# Patient Record
Sex: FEMALE | Race: WHITE | NOT HISPANIC OR LATINO | Employment: UNEMPLOYED | URBAN - METROPOLITAN AREA
[De-identification: names, ages, dates, MRNs, and addresses within clinical notes are randomized per-mention and may not be internally consistent; named-entity substitution may affect disease eponyms.]

---

## 2019-05-23 ENCOUNTER — HOSPITAL ENCOUNTER (OUTPATIENT)
Dept: INFUSION THERAPY | Facility: HOSPITAL | Age: 39
Setting detail: RECURRING SERIES
Discharge: HOME | End: 2019-05-31

## 2019-05-23 LAB
BASOPHILS # BLD AUTO: 0.05 10*3/UL (ref 0–0.2)
BASOPHILS # BLD: 0 % (ref 0–3)
BASOPHILS NFR BLD AUTO: 0.6 % (ref 0–3)
CONV ABS BANDS: 0 % (ref 1–5)
CONV ABS IMM GRAN: 0.07 10*3/UL (ref 0–0.2)
CONV ATYPICAL LYMPHOCYTES: 6 % (ref 0–5)
CONV IMMATURE GRAN: 0.8 % (ref 0–1.8)
CONV SEGMENTED NEUTROPHILS: 64 % (ref 45–70)
DEPRECATED RDW RBC AUTO: 39.3 FL (ref 36.4–46.3)
EOSINOPHIL # BLD AUTO: 0.12 10*3/UL (ref 0–0.7)
EOSINOPHIL # BLD AUTO: 1.3 % (ref 0–7)
EOSINOPHIL NFR BLD AUTO: 1 % (ref 0–7)
ERYTHROCYTE [DISTWIDTH] IN BLOOD BY AUTOMATED COUNT: 12 % (ref 11.7–14.4)
ERYTHROCYTE [SEDIMENTATION RATE] IN BLOOD: 79 MM/H (ref 0–20)
HBA1C MFR BLD: 12.8 G/DL (ref 12–16)
HCT VFR BLD AUTO: 37.9 % (ref 37–47)
LYMPHOCYTES # BLD AUTO: 3.04 10*3/UL (ref 1–5)
MCH RBC QN AUTO: 30.3 PG (ref 27–31)
MCHC RBC AUTO-ENTMCNC: 33.8 G/DL (ref 33–37)
MCV RBC AUTO: 89.8 FL (ref 81–99)
METAMYELOCYTES NFR BLD MANUAL: 1 %
MONOCYTES # BLD AUTO: 0.6 10*3/UL (ref 0.2–1.2)
MONOCYTES NFR BLD AUTO: 6.7 % (ref 3–10)
MONOCYTES NFR BLD MANUAL: 6 % (ref 3–10)
NEUTROPHILS # BLD AUTO: 5.03 10*3/UL (ref 2–8)
NEUTROPHILS NFR BLD AUTO: 56.5 % (ref 30–85)
NRBC CBCN: 0.2 % (ref 0–0.7)
NUC CELL # PRT MANUAL: 0 /100{WBCS}
PLAT MORPH BLD: NORMAL
PLATELET # BLD AUTO: 360 10*3/UL (ref 130–400)
PMV BLD AUTO: 9.8 FL (ref 9.4–12.3)
RBC # BLD AUTO: 4.22 10*6/UL (ref 4.2–5.4)
SMALL PLATELETS BLD QL SMEAR: ADEQUATE
VARIANT LYMPHS NFR BLD MANUAL: 22 % (ref 20–45)
VARIANT LYMPHS NFR BLD MANUAL: 34.1 % (ref 20–45)
WBC # BLD AUTO: 8.91 10*3/UL (ref 4.8–10.8)

## 2019-05-24 LAB
ALBUMIN SERPL-MCNC: 4.2 G/DL (ref 3.5–5)
ALBUMIN/GLOB SERPL: 1 {RATIO} (ref 1.4–2.6)
ALP SERPL-CCNC: 33 U/L (ref 42–98)
ALT SERPL-CCNC: 15 U/L (ref 10–40)
ANION GAP SERPL CALC-SCNC: 17 MMOL/L (ref 8–19)
AST SERPL-CCNC: 17 U/L (ref 15–50)
BILIRUB SERPL-MCNC: 0.21 MG/DL (ref 0.2–1.3)
BUN SERPL-MCNC: 11 MG/DL (ref 5–25)
BUN/CREAT SERPL: 10 {RATIO} (ref 6–20)
CALCIUM SERPL-MCNC: 9.5 MG/DL (ref 8.7–10.4)
CHLORIDE SERPL-SCNC: 99 MMOL/L (ref 99–111)
CK SERPL-CCNC: 38 U/L (ref 35–230)
CONV CO2: 27 MMOL/L (ref 22–32)
CONV TOTAL PROTEIN: 8.3 G/DL (ref 6.3–8.2)
CREAT UR-MCNC: 1.13 MG/DL (ref 0.5–0.9)
CRP SERPL-MCNC: 4.1 MG/L (ref 0–5)
GFR SERPLBLD BASED ON 1.73 SQ M-ARVRAT: >60 ML/MIN/{1.73_M2}
GLOBULIN UR ELPH-MCNC: 4.1 G/DL (ref 2–3.5)
GLUCOSE SERPL-MCNC: 100 MG/DL (ref 65–99)
OSMOLALITY SERPL CALC.SUM OF ELEC: 287 MOSM/KG (ref 273–304)
POTASSIUM SERPL-SCNC: 3.7 MMOL/L (ref 3.5–5.3)
SODIUM SERPL-SCNC: 139 MMOL/L (ref 135–147)

## 2021-07-21 ENCOUNTER — APPOINTMENT (OUTPATIENT)
Dept: GENERAL RADIOLOGY | Facility: HOSPITAL | Age: 41
End: 2021-07-21

## 2021-07-21 ENCOUNTER — HOSPITAL ENCOUNTER (EMERGENCY)
Facility: HOSPITAL | Age: 41
Discharge: HOME | End: 2021-07-21
Attending: EMERGENCY MEDICINE | Admitting: EMERGENCY MEDICINE

## 2021-07-21 VITALS
HEART RATE: 76 BPM | OXYGEN SATURATION: 96 % | SYSTOLIC BLOOD PRESSURE: 98 MMHG | WEIGHT: 135.58 LBS | TEMPERATURE: 97.5 F | BODY MASS INDEX: 25.6 KG/M2 | RESPIRATION RATE: 13 BRPM | DIASTOLIC BLOOD PRESSURE: 63 MMHG | HEIGHT: 61 IN

## 2021-07-21 DIAGNOSIS — R07.9 CHEST PAIN, UNSPECIFIED TYPE: Primary | ICD-10-CM

## 2021-07-21 LAB
ALBUMIN SERPL-MCNC: 4.2 G/DL (ref 3.5–5.2)
ALBUMIN/GLOB SERPL: 1.6 G/DL
ALP SERPL-CCNC: 35 U/L (ref 39–117)
ALT SERPL W P-5'-P-CCNC: 75 U/L (ref 1–33)
ANION GAP SERPL CALCULATED.3IONS-SCNC: 10 MMOL/L (ref 5–15)
AST SERPL-CCNC: 58 U/L (ref 1–32)
BASOPHILS # BLD AUTO: 0.02 10*3/MM3 (ref 0–0.2)
BASOPHILS NFR BLD AUTO: 0.4 % (ref 0–1.5)
BILIRUB SERPL-MCNC: 0.2 MG/DL (ref 0–1.2)
BUN SERPL-MCNC: 17 MG/DL (ref 6–20)
BUN/CREAT SERPL: 23.9 (ref 7–25)
CALCIUM SPEC-SCNC: 9.2 MG/DL (ref 8.6–10.5)
CHLORIDE SERPL-SCNC: 103 MMOL/L (ref 98–107)
CK MB SERPL-CCNC: 2.06 NG/ML
CK SERPL-CCNC: 67 U/L (ref 20–180)
CO2 SERPL-SCNC: 28 MMOL/L (ref 22–29)
CREAT SERPL-MCNC: 0.71 MG/DL (ref 0.57–1)
DEPRECATED RDW RBC AUTO: 41.7 FL (ref 37–54)
EOSINOPHIL # BLD AUTO: 0.15 10*3/MM3 (ref 0–0.4)
EOSINOPHIL NFR BLD AUTO: 2.7 % (ref 0.3–6.2)
ERYTHROCYTE [DISTWIDTH] IN BLOOD BY AUTOMATED COUNT: 12.9 % (ref 12.3–15.4)
GFR SERPL CREATININE-BSD FRML MDRD: 90 ML/MIN/1.73
GLOBULIN UR ELPH-MCNC: 2.6 GM/DL
GLUCOSE SERPL-MCNC: 96 MG/DL (ref 65–99)
HCT VFR BLD AUTO: 28.7 % (ref 34–46.6)
HGB BLD-MCNC: 9.5 G/DL (ref 12–15.9)
HOLD SPECIMEN: NORMAL
HOLD SPECIMEN: NORMAL
IMM GRANULOCYTES # BLD AUTO: 0.02 10*3/MM3 (ref 0–0.05)
IMM GRANULOCYTES NFR BLD AUTO: 0.4 % (ref 0–0.5)
LIPASE SERPL-CCNC: 24 U/L (ref 13–60)
LYMPHOCYTES # BLD AUTO: 1.52 10*3/MM3 (ref 0.7–3.1)
LYMPHOCYTES NFR BLD AUTO: 27.4 % (ref 19.6–45.3)
MAGNESIUM SERPL-MCNC: 1.8 MG/DL (ref 1.6–2.6)
MCH RBC QN AUTO: 29.5 PG (ref 26.6–33)
MCHC RBC AUTO-ENTMCNC: 33.1 G/DL (ref 31.5–35.7)
MCV RBC AUTO: 89.1 FL (ref 79–97)
MONOCYTES # BLD AUTO: 0.36 10*3/MM3 (ref 0.1–0.9)
MONOCYTES NFR BLD AUTO: 6.5 % (ref 5–12)
NEUTROPHILS NFR BLD AUTO: 3.47 10*3/MM3 (ref 1.7–7)
NEUTROPHILS NFR BLD AUTO: 62.6 % (ref 42.7–76)
NRBC BLD AUTO-RTO: 0 /100 WBC (ref 0–0.2)
NT-PROBNP SERPL-MCNC: 504.3 PG/ML (ref 0–450)
PLATELET # BLD AUTO: 198 10*3/MM3 (ref 140–450)
PMV BLD AUTO: 10.4 FL (ref 6–12)
POTASSIUM SERPL-SCNC: 3.7 MMOL/L (ref 3.5–5.2)
PROT SERPL-MCNC: 6.8 G/DL (ref 6–8.5)
QT INTERVAL: 344 MS
RBC # BLD AUTO: 3.22 10*6/MM3 (ref 3.77–5.28)
SODIUM SERPL-SCNC: 141 MMOL/L (ref 136–145)
TROPONIN I SERPL-MCNC: 0 NG/ML (ref 0–0.6)
WBC # BLD AUTO: 5.54 10*3/MM3 (ref 3.4–10.8)
WHOLE BLOOD HOLD SPECIMEN: NORMAL

## 2021-07-21 PROCEDURE — 83735 ASSAY OF MAGNESIUM: CPT | Performed by: EMERGENCY MEDICINE

## 2021-07-21 PROCEDURE — 82553 CREATINE MB FRACTION: CPT | Performed by: EMERGENCY MEDICINE

## 2021-07-21 PROCEDURE — 82550 ASSAY OF CK (CPK): CPT | Performed by: EMERGENCY MEDICINE

## 2021-07-21 PROCEDURE — 99283 EMERGENCY DEPT VISIT LOW MDM: CPT

## 2021-07-21 PROCEDURE — 93005 ELECTROCARDIOGRAM TRACING: CPT | Performed by: EMERGENCY MEDICINE

## 2021-07-21 PROCEDURE — 71045 X-RAY EXAM CHEST 1 VIEW: CPT

## 2021-07-21 PROCEDURE — 83880 ASSAY OF NATRIURETIC PEPTIDE: CPT | Performed by: EMERGENCY MEDICINE

## 2021-07-21 PROCEDURE — 83690 ASSAY OF LIPASE: CPT | Performed by: EMERGENCY MEDICINE

## 2021-07-21 PROCEDURE — 85025 COMPLETE CBC W/AUTO DIFF WBC: CPT | Performed by: EMERGENCY MEDICINE

## 2021-07-21 PROCEDURE — 36415 COLL VENOUS BLD VENIPUNCTURE: CPT

## 2021-07-21 PROCEDURE — 84484 ASSAY OF TROPONIN QUANT: CPT

## 2021-07-21 PROCEDURE — 80053 COMPREHEN METABOLIC PANEL: CPT | Performed by: EMERGENCY MEDICINE

## 2021-07-21 PROCEDURE — 93010 ELECTROCARDIOGRAM REPORT: CPT | Performed by: INTERNAL MEDICINE

## 2021-07-21 RX ORDER — ASPIRIN 81 MG/1
324 TABLET, CHEWABLE ORAL ONCE
Status: DISCONTINUED | OUTPATIENT
Start: 2021-07-21 | End: 2021-07-21

## 2021-07-21 RX ORDER — SODIUM CHLORIDE 0.9 % (FLUSH) 0.9 %
10 SYRINGE (ML) INJECTION AS NEEDED
Status: DISCONTINUED | OUTPATIENT
Start: 2021-07-21 | End: 2021-07-21 | Stop reason: HOSPADM

## 2021-07-21 NOTE — ED PROVIDER NOTES
"Subjective     History provided by:  Patient ()    Pt is a 41 y.o. female who presents to ED via private vehicle accompanied by  for CP. This started today and is still present. It is moderate in severity.     The officer reports that as she \"cuffing\" the pt for DUI, the pt started complaining of CP.     Review of Systems   Constitutional: Negative for diaphoresis.   HENT: Negative for ear discharge and nosebleeds.    Eyes: Negative for discharge.   Respiratory: Negative for cough.    Cardiovascular: Positive for chest pain.   Gastrointestinal: Negative for vomiting.   All other systems reviewed and are negative.      History reviewed. No pertinent past medical history.    Allergies   Allergen Reactions   • Zofran [Ondansetron] Rash       Past Surgical History:   Procedure Laterality Date   • CARDIAC VALVE REPLACEMENT     • DILATATION AND CURETTAGE     • TUBAL ABDOMINAL LIGATION         History reviewed. No pertinent family history.    Social History     Socioeconomic History   • Marital status:      Spouse name: Not on file   • Number of children: Not on file   • Years of education: Not on file   • Highest education level: Not on file   Tobacco Use   • Smoking status: Current Every Day Smoker     Packs/day: 2.00   Substance and Sexual Activity   • Drug use: Yes     Comment: heroin occasionally          Objective   Physical Exam  Vitals and nursing note reviewed.   Constitutional:       General: She is not in acute distress.  HENT:      Head: Normocephalic and atraumatic.      Nose: Nose normal.   Eyes:      General: No scleral icterus.  Cardiovascular:      Rate and Rhythm: Normal rate and regular rhythm.      Heart sounds: Normal heart sounds.   Pulmonary:      Effort: Pulmonary effort is normal. No respiratory distress.      Breath sounds: Normal breath sounds.   Abdominal:      General: There is no distension.   Musculoskeletal:         General: Normal range of motion.      " Cervical back: Neck supple.      Right lower leg: No edema.      Left lower leg: No edema.   Skin:     General: Skin is warm and dry.      Findings: No rash.   Neurological:      Mental Status: She is alert.         Procedures    EKG: Sinus tachycardia with a rate of 111. Normal P waves and BETHANY. Normal QRS and axis. Normal ST segments and QTc. No previous for comparison.          ED Course                                            MDM  This is a 41-year-old female who presented for evaluation after she reported chest pain to police in the midst of a DUI arrest.  The patient here in the department is in no acute distress.  Her EKG and troponin are nonischemic.  Her chest x-ray shows no evidence of pneumonia or pneumothorax or trauma.  From my standpoint I think she is medically stable for incarceration.  Final diagnoses:   Chest pain, unspecified type       Documentation assistance provided by simran Mejia.  Information recorded by the scribe was done at my direction and has been verified and validated by me.     Kimberly Mejia  07/21/21 1401       Kimberly Mejia  07/21/21 [id number]       Latrell Sigala DO  07/21/21 1810

## 2025-03-09 ENCOUNTER — HOSPITAL ENCOUNTER (INPATIENT)
Facility: HOSPITAL | Age: 45
LOS: 2 days | Discharge: HOME | End: 2025-03-13
Attending: EMERGENCY MEDICINE | Admitting: INTERNAL MEDICINE
Payer: MEDICARE

## 2025-03-09 DIAGNOSIS — M46.20 OSTEOMYELITIS OF SPINE: ICD-10-CM

## 2025-03-09 DIAGNOSIS — Z09 FOLLOW-UP EXAM: ICD-10-CM

## 2025-03-09 DIAGNOSIS — M54.9 INTRACTABLE BACK PAIN: Primary | ICD-10-CM

## 2025-03-09 PROCEDURE — 99285 EMERGENCY DEPT VISIT HI MDM: CPT

## 2025-03-09 PROCEDURE — 36415 COLL VENOUS BLD VENIPUNCTURE: CPT

## 2025-03-09 NOTE — Clinical Note
Level of Care: Med/Surg [1]   Diagnosis: Osteomyelitis of spine [460621]   Admitting Physician: TRINA GUERRERO [[id number]]   Attending Physician: TRINA GUERRERO [[id number]]   Is patient appropriate for Inpatient Observation Unit?: No [0]

## 2025-03-10 ENCOUNTER — APPOINTMENT (OUTPATIENT)
Dept: GENERAL RADIOLOGY | Facility: HOSPITAL | Age: 45
End: 2025-03-10
Payer: MEDICARE

## 2025-03-10 ENCOUNTER — APPOINTMENT (OUTPATIENT)
Dept: MRI IMAGING | Facility: HOSPITAL | Age: 45
End: 2025-03-10
Payer: MEDICARE

## 2025-03-10 PROBLEM — K21.9 GASTROESOPHAGEAL REFLUX DISEASE: Status: ACTIVE | Noted: 2023-02-06

## 2025-03-10 PROBLEM — M46.20 OSTEOMYELITIS OF SPINE: Status: ACTIVE | Noted: 2025-03-10

## 2025-03-10 PROBLEM — Z95.2 H/O MITRAL VALVE REPLACEMENT: Status: ACTIVE | Noted: 2025-03-10

## 2025-03-10 PROBLEM — G06.2 EPIDURAL ABSCESS: Status: ACTIVE | Noted: 2025-03-10

## 2025-03-10 PROBLEM — B19.10 HEPATITIS B: Status: ACTIVE | Noted: 2025-03-10

## 2025-03-10 PROBLEM — J44.9 CHRONIC OBSTRUCTIVE PULMONARY DISEASE: Status: ACTIVE | Noted: 2024-10-07

## 2025-03-10 PROBLEM — Z86.79 PERSONAL HISTORY OF OTHER DISEASES OF THE CIRCULATORY SYSTEM: Status: ACTIVE | Noted: 2019-09-13

## 2025-03-10 PROBLEM — F15.10 AMPHETAMINE ABUSE: Status: ACTIVE | Noted: 2017-11-29

## 2025-03-10 PROBLEM — I42.9 CARDIOMYOPATHY: Status: ACTIVE | Noted: 2017-10-15

## 2025-03-10 PROBLEM — F19.90 INTRAVENOUS DRUG USER: Status: ACTIVE | Noted: 2019-09-13

## 2025-03-10 PROBLEM — M86.9 OSTEOMYELITIS: Status: ACTIVE | Noted: 2025-03-10

## 2025-03-10 PROBLEM — B18.1 CHRONIC HEPATITIS B VIRUS INFECTION: Status: ACTIVE | Noted: 2025-03-10

## 2025-03-10 PROBLEM — F11.21 OPIOID USE DISORDER, SEVERE, IN EARLY REMISSION: Status: ACTIVE | Noted: 2020-04-21

## 2025-03-10 PROBLEM — I50.9 CONGESTIVE HEART FAILURE: Status: ACTIVE | Noted: 2023-02-06

## 2025-03-10 LAB
ALBUMIN SERPL-MCNC: 4 G/DL (ref 3.5–5.2)
ALBUMIN SERPL-MCNC: 4.2 G/DL (ref 3.5–5.2)
ALBUMIN/GLOB SERPL: 1 G/DL
ALBUMIN/GLOB SERPL: 1.1 G/DL
ALP SERPL-CCNC: 50 U/L (ref 39–117)
ALP SERPL-CCNC: 57 U/L (ref 39–117)
ALT SERPL W P-5'-P-CCNC: 8 U/L (ref 1–33)
ALT SERPL W P-5'-P-CCNC: 9 U/L (ref 1–33)
ANION GAP SERPL CALCULATED.3IONS-SCNC: 10.6 MMOL/L (ref 5–15)
ANION GAP SERPL CALCULATED.3IONS-SCNC: 13.3 MMOL/L (ref 5–15)
AST SERPL-CCNC: 16 U/L (ref 1–32)
AST SERPL-CCNC: 16 U/L (ref 1–32)
BASOPHILS # BLD AUTO: 0.02 10*3/MM3 (ref 0–0.2)
BASOPHILS NFR BLD AUTO: 0.2 % (ref 0–1.5)
BILIRUB SERPL-MCNC: <0.2 MG/DL (ref 0–1.2)
BILIRUB SERPL-MCNC: <0.2 MG/DL (ref 0–1.2)
BUN SERPL-MCNC: 16 MG/DL (ref 6–20)
BUN SERPL-MCNC: 20 MG/DL (ref 6–20)
BUN/CREAT SERPL: 25.4 (ref 7–25)
BUN/CREAT SERPL: 28.2 (ref 7–25)
CALCIUM SPEC-SCNC: 9.6 MG/DL (ref 8.6–10.5)
CALCIUM SPEC-SCNC: 9.7 MG/DL (ref 8.6–10.5)
CHLORIDE SERPL-SCNC: 104 MMOL/L (ref 98–107)
CHLORIDE SERPL-SCNC: 106 MMOL/L (ref 98–107)
CO2 SERPL-SCNC: 23.7 MMOL/L (ref 22–29)
CO2 SERPL-SCNC: 24.4 MMOL/L (ref 22–29)
CREAT SERPL-MCNC: 0.63 MG/DL (ref 0.57–1)
CREAT SERPL-MCNC: 0.71 MG/DL (ref 0.57–1)
CRP SERPL-MCNC: 5.73 MG/DL (ref 0–0.5)
D-LACTATE SERPL-SCNC: 1.2 MMOL/L (ref 0.5–2)
DEPRECATED RDW RBC AUTO: 43.2 FL (ref 37–54)
DEPRECATED RDW RBC AUTO: 44.4 FL (ref 37–54)
EGFRCR SERPLBLD CKD-EPI 2021: 106.3 ML/MIN/1.73
EGFRCR SERPLBLD CKD-EPI 2021: 111 ML/MIN/1.73
EOSINOPHIL # BLD AUTO: 0.09 10*3/MM3 (ref 0–0.4)
EOSINOPHIL NFR BLD AUTO: 1.1 % (ref 0.3–6.2)
ERYTHROCYTE [DISTWIDTH] IN BLOOD BY AUTOMATED COUNT: 14.4 % (ref 12.3–15.4)
ERYTHROCYTE [DISTWIDTH] IN BLOOD BY AUTOMATED COUNT: 14.5 % (ref 12.3–15.4)
ERYTHROCYTE [SEDIMENTATION RATE] IN BLOOD: 77 MM/HR (ref 0–20)
GLOBULIN UR ELPH-MCNC: 4 GM/DL
GLOBULIN UR ELPH-MCNC: 4.1 GM/DL
GLUCOSE SERPL-MCNC: 110 MG/DL (ref 65–99)
GLUCOSE SERPL-MCNC: 93 MG/DL (ref 65–99)
HCT VFR BLD AUTO: 32.3 % (ref 34–46.6)
HCT VFR BLD AUTO: 32.6 % (ref 34–46.6)
HGB BLD-MCNC: 10.5 G/DL (ref 12–15.9)
HGB BLD-MCNC: 10.5 G/DL (ref 12–15.9)
IMM GRANULOCYTES # BLD AUTO: 0.02 10*3/MM3 (ref 0–0.05)
IMM GRANULOCYTES NFR BLD AUTO: 0.2 % (ref 0–0.5)
LYMPHOCYTES # BLD AUTO: 2.08 10*3/MM3 (ref 0.7–3.1)
LYMPHOCYTES NFR BLD AUTO: 26 % (ref 19.6–45.3)
MCH RBC QN AUTO: 27 PG (ref 26.6–33)
MCH RBC QN AUTO: 27 PG (ref 26.6–33)
MCHC RBC AUTO-ENTMCNC: 32.2 G/DL (ref 31.5–35.7)
MCHC RBC AUTO-ENTMCNC: 32.5 G/DL (ref 31.5–35.7)
MCV RBC AUTO: 83 FL (ref 79–97)
MCV RBC AUTO: 83.8 FL (ref 79–97)
MONOCYTES # BLD AUTO: 0.47 10*3/MM3 (ref 0.1–0.9)
MONOCYTES NFR BLD AUTO: 5.9 % (ref 5–12)
NEUTROPHILS NFR BLD AUTO: 5.33 10*3/MM3 (ref 1.7–7)
NEUTROPHILS NFR BLD AUTO: 66.6 % (ref 42.7–76)
NRBC BLD AUTO-RTO: 0 /100 WBC (ref 0–0.2)
PLATELET # BLD AUTO: 223 10*3/MM3 (ref 140–450)
PLATELET # BLD AUTO: 303 10*3/MM3 (ref 140–450)
PMV BLD AUTO: 9.3 FL (ref 6–12)
PMV BLD AUTO: 9.7 FL (ref 6–12)
POTASSIUM SERPL-SCNC: 3.5 MMOL/L (ref 3.5–5.2)
POTASSIUM SERPL-SCNC: 3.8 MMOL/L (ref 3.5–5.2)
PROT SERPL-MCNC: 8.1 G/DL (ref 6–8.5)
PROT SERPL-MCNC: 8.2 G/DL (ref 6–8.5)
RBC # BLD AUTO: 3.89 10*6/MM3 (ref 3.77–5.28)
RBC # BLD AUTO: 3.89 10*6/MM3 (ref 3.77–5.28)
SODIUM SERPL-SCNC: 139 MMOL/L (ref 136–145)
SODIUM SERPL-SCNC: 143 MMOL/L (ref 136–145)
WBC NRBC COR # BLD AUTO: 7.07 10*3/MM3 (ref 3.4–10.8)
WBC NRBC COR # BLD AUTO: 8.01 10*3/MM3 (ref 3.4–10.8)

## 2025-03-10 PROCEDURE — 83605 ASSAY OF LACTIC ACID: CPT | Performed by: NURSE PRACTITIONER

## 2025-03-10 PROCEDURE — A9577 INJ MULTIHANCE: HCPCS | Performed by: INTERNAL MEDICINE

## 2025-03-10 PROCEDURE — 25010000002 LORAZEPAM PER 2 MG: Performed by: NURSE PRACTITIONER

## 2025-03-10 PROCEDURE — 25510000002 GADOBENATE DIMEGLUMINE 529 MG/ML SOLUTION: Performed by: INTERNAL MEDICINE

## 2025-03-10 PROCEDURE — 85025 COMPLETE CBC W/AUTO DIFF WBC: CPT | Performed by: EMERGENCY MEDICINE

## 2025-03-10 PROCEDURE — G0378 HOSPITAL OBSERVATION PER HR: HCPCS

## 2025-03-10 PROCEDURE — 36415 COLL VENOUS BLD VENIPUNCTURE: CPT | Performed by: NURSE PRACTITIONER

## 2025-03-10 PROCEDURE — 80053 COMPREHEN METABOLIC PANEL: CPT | Performed by: EMERGENCY MEDICINE

## 2025-03-10 PROCEDURE — 86140 C-REACTIVE PROTEIN: CPT | Performed by: EMERGENCY MEDICINE

## 2025-03-10 PROCEDURE — 85027 COMPLETE CBC AUTOMATED: CPT | Performed by: NURSE PRACTITIONER

## 2025-03-10 PROCEDURE — 80053 COMPREHEN METABOLIC PANEL: CPT | Performed by: NURSE PRACTITIONER

## 2025-03-10 PROCEDURE — 25010000002 HYDROMORPHONE 1 MG/ML SOLUTION: Performed by: EMERGENCY MEDICINE

## 2025-03-10 PROCEDURE — 85652 RBC SED RATE AUTOMATED: CPT | Performed by: EMERGENCY MEDICINE

## 2025-03-10 PROCEDURE — 25010000002 DAPTOMYCIN PER 1 MG: Performed by: NURSE PRACTITIONER

## 2025-03-10 PROCEDURE — 72158 MRI LUMBAR SPINE W/O & W/DYE: CPT

## 2025-03-10 PROCEDURE — 25010000002 CEFTRIAXONE PER 250 MG: Performed by: NURSE PRACTITIONER

## 2025-03-10 RX ORDER — SODIUM CHLORIDE 0.9 % (FLUSH) 0.9 %
10 SYRINGE (ML) INJECTION AS NEEDED
Status: DISCONTINUED | OUTPATIENT
Start: 2025-03-10 | End: 2025-03-13 | Stop reason: HOSPADM

## 2025-03-10 RX ORDER — ACETAMINOPHEN 325 MG/1
650 TABLET ORAL EVERY 4 HOURS PRN
Status: DISCONTINUED | OUTPATIENT
Start: 2025-03-10 | End: 2025-03-13 | Stop reason: HOSPADM

## 2025-03-10 RX ORDER — AMOXICILLIN 250 MG
2 CAPSULE ORAL 2 TIMES DAILY PRN
Status: DISCONTINUED | OUTPATIENT
Start: 2025-03-10 | End: 2025-03-13 | Stop reason: HOSPADM

## 2025-03-10 RX ORDER — NITROGLYCERIN 0.4 MG/1
0.4 TABLET SUBLINGUAL
Status: DISCONTINUED | OUTPATIENT
Start: 2025-03-10 | End: 2025-03-13 | Stop reason: HOSPADM

## 2025-03-10 RX ORDER — HYDROCODONE BITARTRATE AND ACETAMINOPHEN 5; 325 MG/1; MG/1
1 TABLET ORAL EVERY 6 HOURS PRN
Refills: 0 | Status: DISCONTINUED | OUTPATIENT
Start: 2025-03-10 | End: 2025-03-10

## 2025-03-10 RX ORDER — SODIUM CHLORIDE 9 MG/ML
40 INJECTION, SOLUTION INTRAVENOUS AS NEEDED
Status: DISCONTINUED | OUTPATIENT
Start: 2025-03-10 | End: 2025-03-13 | Stop reason: HOSPADM

## 2025-03-10 RX ORDER — LORAZEPAM 2 MG/ML
1 INJECTION INTRAMUSCULAR ONCE
Status: COMPLETED | OUTPATIENT
Start: 2025-03-10 | End: 2025-03-10

## 2025-03-10 RX ORDER — BISACODYL 10 MG
10 SUPPOSITORY, RECTAL RECTAL DAILY PRN
Status: DISCONTINUED | OUTPATIENT
Start: 2025-03-10 | End: 2025-03-13 | Stop reason: HOSPADM

## 2025-03-10 RX ORDER — ACETAMINOPHEN 160 MG/5ML
650 SOLUTION ORAL EVERY 4 HOURS PRN
Status: DISCONTINUED | OUTPATIENT
Start: 2025-03-10 | End: 2025-03-13 | Stop reason: HOSPADM

## 2025-03-10 RX ORDER — HYDROCODONE BITARTRATE AND ACETAMINOPHEN 5; 325 MG/1; MG/1
1 TABLET ORAL EVERY 4 HOURS PRN
Refills: 0 | Status: DISCONTINUED | OUTPATIENT
Start: 2025-03-10 | End: 2025-03-11

## 2025-03-10 RX ORDER — SODIUM CHLORIDE 0.9 % (FLUSH) 0.9 %
10 SYRINGE (ML) INJECTION EVERY 12 HOURS SCHEDULED
Status: DISCONTINUED | OUTPATIENT
Start: 2025-03-10 | End: 2025-03-13 | Stop reason: HOSPADM

## 2025-03-10 RX ORDER — FAMOTIDINE 20 MG/1
20 TABLET, FILM COATED ORAL 2 TIMES DAILY PRN
Status: DISCONTINUED | OUTPATIENT
Start: 2025-03-10 | End: 2025-03-13 | Stop reason: HOSPADM

## 2025-03-10 RX ORDER — ACETAMINOPHEN 650 MG/1
650 SUPPOSITORY RECTAL EVERY 4 HOURS PRN
Status: DISCONTINUED | OUTPATIENT
Start: 2025-03-10 | End: 2025-03-13 | Stop reason: HOSPADM

## 2025-03-10 RX ORDER — BISACODYL 5 MG/1
5 TABLET, DELAYED RELEASE ORAL DAILY PRN
Status: DISCONTINUED | OUTPATIENT
Start: 2025-03-10 | End: 2025-03-13 | Stop reason: HOSPADM

## 2025-03-10 RX ORDER — POLYETHYLENE GLYCOL 3350 17 G/17G
17 POWDER, FOR SOLUTION ORAL DAILY PRN
Status: DISCONTINUED | OUTPATIENT
Start: 2025-03-10 | End: 2025-03-13 | Stop reason: HOSPADM

## 2025-03-10 RX ADMIN — LORAZEPAM 1 MG: 2 INJECTION INTRAMUSCULAR; INTRAVENOUS at 07:44

## 2025-03-10 RX ADMIN — DAPTOMYCIN 500 MG: 500 INJECTION, POWDER, LYOPHILIZED, FOR SOLUTION INTRAVENOUS at 11:49

## 2025-03-10 RX ADMIN — Medication 10 ML: at 08:53

## 2025-03-10 RX ADMIN — GADOBENATE DIMEGLUMINE 12 ML: 529 INJECTION, SOLUTION INTRAVENOUS at 10:28

## 2025-03-10 RX ADMIN — HYDROCODONE BITARTRATE AND ACETAMINOPHEN 1 TABLET: 5; 325 TABLET ORAL at 12:32

## 2025-03-10 RX ADMIN — HYDROMORPHONE HYDROCHLORIDE 1 MG: 1 INJECTION, SOLUTION INTRAMUSCULAR; INTRAVENOUS; SUBCUTANEOUS at 00:15

## 2025-03-10 RX ADMIN — ACETAMINOPHEN 650 MG: 325 TABLET, FILM COATED ORAL at 16:21

## 2025-03-10 RX ADMIN — CEFTRIAXONE SODIUM 1000 MG: 1 INJECTION, POWDER, FOR SOLUTION INTRAMUSCULAR; INTRAVENOUS at 07:43

## 2025-03-10 RX ADMIN — HYDROMORPHONE HYDROCHLORIDE 1 MG: 1 INJECTION, SOLUTION INTRAMUSCULAR; INTRAVENOUS; SUBCUTANEOUS at 01:43

## 2025-03-10 RX ADMIN — Medication 10 ML: at 20:52

## 2025-03-10 RX ADMIN — HYDROCODONE BITARTRATE AND ACETAMINOPHEN 1 TABLET: 5; 325 TABLET ORAL at 06:13

## 2025-03-10 RX ADMIN — HYDROCODONE BITARTRATE AND ACETAMINOPHEN 1 TABLET: 5; 325 TABLET ORAL at 18:20

## 2025-03-10 RX ADMIN — ACETAMINOPHEN 650 MG: 325 TABLET, FILM COATED ORAL at 20:49

## 2025-03-10 RX ADMIN — HYDROCODONE BITARTRATE AND ACETAMINOPHEN 1 TABLET: 5; 325 TABLET ORAL at 22:47

## 2025-03-10 NOTE — ED NOTES
"Nursing report ED to floor  Dawn Young  44 y.o.  female    HPI :  HPI  Stated Reason for Visit: Bilayeral lower back pain. Hx of spine abcesses.  History Obtained From: patient  Precipitating Event(s): other (see comments) (hx of spine abscess.)  Duration (Weeks): 16    Chief Complaint  Chief Complaint   Patient presents with    Abscess     Back pain       Admitting doctor:   Sunita Mar MD    Admitting diagnosis:   The primary encounter diagnosis was Intractable back pain. A diagnosis of Osteomyelitis of spine was also pertinent to this visit.    Code status:   Current Code Status       Date Active Code Status Order ID Comments User Context       Not on file            Allergies:   Toradol [ketorolac tromethamine] and Zofran [ondansetron]    Isolation:   No active isolations    Intake and Output  No intake or output data in the 24 hours ending 03/10/25 0238    Weight:       03/09/25  2334   Weight: 62.6 kg (138 lb)       Most recent vitals:   Vitals:    03/09/25 2333 03/09/25 2334 03/09/25 2337 03/09/25 2343   BP:   (!) 163/123 (!) 132/108   BP Location:   Left arm Left arm   Patient Position:   Sitting Sitting   Pulse:  114     Resp:  18     Temp:  97.2 °F (36.2 °C)     TempSrc:  Tympanic     SpO2:  100%     Weight: 62.6 kg (138 lb) 62.6 kg (138 lb)     Height: 157.5 cm (62\") 157.5 cm (62\")         Active LDAs/IV Access:   Lines, Drains & Airways       Active LDAs       Name Placement date Placement time Site Days    PICC Single Lumen 03/10/25 Right Other (Comment) 03/10/25  0015  Other (Comment)  PT doesnt know its from outpatient antibiotics  less than 1    Peripheral IV 07/21/21 1434 Right Antecubital 07/21/21  1434  Antecubital  1327                    Labs (abnormal labs have a star):   Labs Reviewed   COMPREHENSIVE METABOLIC PANEL - Abnormal; Notable for the following components:       Result Value    Glucose 110 (*)     BUN/Creatinine Ratio 28.2 (*)     All other components within normal limits    " Narrative:     GFR Categories in Chronic Kidney Disease (CKD)      GFR Category          GFR (mL/min/1.73)    Interpretation  G1                     90 or greater         Normal or high (1)  G2                      60-89                Mild decrease (1)  G3a                   45-59                Mild to moderate decrease  G3b                   30-44                Moderate to severe decrease  G4                    15-29                Severe decrease  G5                    14 or less           Kidney failure          (1)In the absence of evidence of kidney disease, neither GFR category G1 or G2 fulfill the criteria for CKD.    eGFR calculation 2021 CKD-EPI creatinine equation, which does not include race as a factor   C-REACTIVE PROTEIN - Abnormal; Notable for the following components:    C-Reactive Protein 5.73 (*)     All other components within normal limits   SEDIMENTATION RATE - Abnormal; Notable for the following components:    Sed Rate 77 (*)     All other components within normal limits   CBC WITH AUTO DIFFERENTIAL - Abnormal; Notable for the following components:    Hemoglobin 10.5 (*)     Hematocrit 32.6 (*)     All other components within normal limits   CBC AND DIFFERENTIAL    Narrative:     The following orders were created for panel order CBC & Differential.  Procedure                               Abnormality         Status                     ---------                               -----------         ------                     CBC Auto Differential[[id number]]        Abnormal            Final result                 Please view results for these tests on the individual orders.       EKG:   No orders to display       Meds given in ED:   Medications   HYDROmorphone (DILAUDID) injection 1 mg (1 mg Intravenous Given 3/10/25 0015)   HYDROmorphone (DILAUDID) injection 1 mg (1 mg Intravenous Given 3/10/25 0143)       Imaging results:  No radiology results for the last day    Ambulatory status:   -  assist    Social issues:   Social History     Socioeconomic History    Marital status:    Tobacco Use    Smoking status: Every Day     Current packs/day: 2.00     Types: Cigarettes   Substance and Sexual Activity    Alcohol use: Not Currently    Drug use: Yes     Types: Marijuana     Comment: heroin occasionally        Peripheral Neurovascular       Neuro Cognitive       Learning       Respiratory       Abdominal Pain       Pain Assessments  Pain (Adult)  (0-10) Pain Rating: Rest: 9  Pain Location: back  Pain Side/Orientation: bilateral, lower    NIH Stroke Scale       Milli MILA Jackson  03/10/25 02:38 EDT

## 2025-03-10 NOTE — CASE MANAGEMENT/SOCIAL WORK
Continued Stay Note  [facility]     Patient Name: Dawn Young  MRN: [id number]  Today's Date: 3/10/2025    Admit Date: 3/9/2025        Discharge Plan       Row Name 03/10/25 1434       Plan    Plan Comments CCP tried to screen patient two times today and was only able to get very few answers.  Pt appeared to be drowsy and could not stay awake for more then five seconds at a time.  Pt did confirm she lives alone in a first floor apartment and is IADL's.  Pt reports she does not drive because she no longer has a license.  CCP noted a PICC line in her right upper arm.  Pt reports her Pharmacy is [place], [place] and her PCP is from [facility] in [place].  Pt fell back to sleep and would not uncover her head with blanket for rest of interview...........Griselda REYES/STACI                   Discharge Codes    No documentation.                 Expected Discharge Date and Time       Expected Discharge Date Expected Discharge Time    Mar 12, 2025               Griselda Wagner RN

## 2025-03-10 NOTE — PLAN OF CARE
Goal Outcome Evaluation:  Plan of Care Reviewed With: patient        Progress: no change  Outcome Evaluation: vss. a&ox4. ra. ambulates ad otto. reg diet. meds whole with thins. PICC RUE - dsg changed 3/9/25. con't iv abx per order. educated on bp monitoring. plan to dc home when appropriate. con't plan of care.

## 2025-03-10 NOTE — SIGNIFICANT NOTE
03/10/25 1011   OTHER   Discipline physical therapist   Rehab Time/Intention   Session Not Performed other (see comments)  (per nsg, pt has been up ad otto. no acute PT needs at this time. Pt leaving floor for MRI. Will sign off. please re-consult if any needs arise.)   Recommendation   PT - Next Appointment 03/11/25

## 2025-03-10 NOTE — PLAN OF CARE
Goal Outcome Evaluation:  Plan of Care Reviewed With: patient     Outcome Evaluation: Pt admitted with osteomyelitis of the spine. MRI scheduled for this morning with paperwork completed. SR- ST on the tele monitor. VSS on room air this morning. Pain treated per MAR. PICC line placement verified with XR. IV abx started. Will continue with POC.

## 2025-03-10 NOTE — ED PROVIDER NOTES
EMERGENCY DEPARTMENT ENCOUNTER  Room Number:  05/05  PCP: Provider, No Known  Independent Historians: Patient      HPI:  Chief Complaint: had concerns including Abscess (Back pain).     A complete HPI/ROS/PMH/PSH/SH/FH are unobtainable due to: None    Chronic or social conditions impacting patient care (Social Determinants of Health): None      Context: Dawn Young is a 44 y.o. female with a medical history of IVDU, heart failure, endocarditis, mitral valve replacement who presents to the ED c/o acute back pain.  Patient reports she has been dealing with endocarditis, thoracic and lumbar epidural abscesses, and discitis osteomyelitis.  Is currently on Rocephin and daptomycin.  Reports she has been on Suboxone but has not been taking this for approximately 1 month.  She presents today primarily complaining of persistent back pain.  States she has an upcoming appointment with pain management.  No recent fall or back injury.  Has had no prior back surgeries.  She denies bowel/bladder incontinence, saddle anesthesia, or numbness/weakness of the extremities.  No other systemic complaints at this time.      Review of prior external notes (non-ED) -and- Review of prior external test results outside of this encounter:  Patient seen in office by infectious disease on 3/6/2025 for osteomyelitis.  Reviewed assessment and plan.  Patient will continue ceftriaxone and daptomycin through 4/1/2025.  Reviewed labs collected yesterday.  CBC with hemoglobin 11, CMP with creatinine 0.51.    Prescription drug monitoring program review:     N/A    PAST MEDICAL HISTORY  Active Ambulatory Problems     Diagnosis Date Noted    No Active Ambulatory Problems     Resolved Ambulatory Problems     Diagnosis Date Noted    No Resolved Ambulatory Problems     No Additional Past Medical History         PAST SURGICAL HISTORY  Past Surgical History:   Procedure Laterality Date    CARDIAC VALVE REPLACEMENT      DILATATION AND CURETTAGE      TUBAL  ABDOMINAL LIGATION           FAMILY HISTORY  History reviewed. No pertinent family history.      SOCIAL HISTORY  Social History     Socioeconomic History    Marital status:    Tobacco Use    Smoking status: Every Day     Current packs/day: 2.00     Types: Cigarettes   Substance and Sexual Activity    Alcohol use: Not Currently    Drug use: Yes     Types: Marijuana     Comment: heroin occasionally          ALLERGIES  Toradol [ketorolac tromethamine] and Zofran [ondansetron]      REVIEW OF SYSTEMS  Included in HPI  All systems reviewed and negative except for those discussed in HPI.      PHYSICAL EXAM    I have reviewed the triage vital signs and nursing notes.    ED Triage Vitals   Temp Heart Rate Resp BP SpO2   03/09/25 2334 03/09/25 2334 03/09/25 2334 03/09/25 2337 03/09/25 2334   97.2 °F (36.2 °C) 114 18 (!) 163/123 100 %      Temp src Heart Rate Source Patient Position BP Location FiO2 (%)   03/09/25 2334 -- 03/09/25 2337 03/09/25 2337 --   Tympanic  Sitting Left arm        Physical Exam  Constitutional:       General: She is not in acute distress.     Appearance: She is well-developed.   HENT:      Head: Normocephalic and atraumatic.   Eyes:      Extraocular Movements: Extraocular movements intact.   Cardiovascular:      Rate and Rhythm: Normal rate and regular rhythm.      Heart sounds: Normal heart sounds.      Comments: PICC line right upper extremity.  Distal pulses intact  Pulmonary:      Effort: Pulmonary effort is normal.      Breath sounds: Normal breath sounds.   Abdominal:      General: There is no distension.   Musculoskeletal:      Comments: Diffuse thoracolumbar spine tenderness.  No step-off or deformity   Skin:     General: Skin is warm.   Neurological:      General: No focal deficit present.      Mental Status: She is alert and oriented to person, place, and time.      Comments: SILT all 4 extremities.  Motor strength 5/5 all 4 extremities   Psychiatric:         Mood and Affect: Mood  normal.             LAB RESULTS  Recent Results (from the past 24 hours)   Comprehensive Metabolic Panel    Collection Time: 03/10/25 12:11 AM    Specimen: Blood   Result Value Ref Range    Glucose 110 (H) 65 - 99 mg/dL    BUN 20 6 - 20 mg/dL    Creatinine 0.71 0.57 - 1.00 mg/dL    Sodium 143 136 - 145 mmol/L    Potassium 3.5 3.5 - 5.2 mmol/L    Chloride 106 98 - 107 mmol/L    CO2 23.7 22.0 - 29.0 mmol/L    Calcium 9.6 8.6 - 10.5 mg/dL    Total Protein 8.2 6.0 - 8.5 g/dL    Albumin 4.2 3.5 - 5.2 g/dL    ALT (SGPT) 9 1 - 33 U/L    AST (SGOT) 16 1 - 32 U/L    Alkaline Phosphatase 57 39 - 117 U/L    Total Bilirubin <0.2 0.0 - 1.2 mg/dL    Globulin 4.0 gm/dL    A/G Ratio 1.1 g/dL    BUN/Creatinine Ratio 28.2 (H) 7.0 - 25.0    Anion Gap 13.3 5.0 - 15.0 mmol/L    eGFR 106.3 >60.0 mL/min/1.73   C-reactive Protein    Collection Time: 03/10/25 12:11 AM    Specimen: Blood   Result Value Ref Range    C-Reactive Protein 5.73 (H) 0.00 - 0.50 mg/dL   Sedimentation Rate    Collection Time: 03/10/25 12:11 AM    Specimen: Blood   Result Value Ref Range    Sed Rate 77 (H) 0 - 20 mm/hr   CBC Auto Differential    Collection Time: 03/10/25 12:11 AM    Specimen: Blood   Result Value Ref Range    WBC 8.01 3.40 - 10.80 10*3/mm3    RBC 3.89 3.77 - 5.28 10*6/mm3    Hemoglobin 10.5 (L) 12.0 - 15.9 g/dL    Hematocrit 32.6 (L) 34.0 - 46.6 %    MCV 83.8 79.0 - 97.0 fL    MCH 27.0 26.6 - 33.0 pg    MCHC 32.2 31.5 - 35.7 g/dL    RDW 14.5 12.3 - 15.4 %    RDW-SD 44.4 37.0 - 54.0 fl    MPV 9.3 6.0 - 12.0 fL    Platelets 303 140 - 450 10*3/mm3    Neutrophil % 66.6 42.7 - 76.0 %    Lymphocyte % 26.0 19.6 - 45.3 %    Monocyte % 5.9 5.0 - 12.0 %    Eosinophil % 1.1 0.3 - 6.2 %    Basophil % 0.2 0.0 - 1.5 %    Immature Grans % 0.2 0.0 - 0.5 %    Neutrophils, Absolute 5.33 1.70 - 7.00 10*3/mm3    Lymphocytes, Absolute 2.08 0.70 - 3.10 10*3/mm3    Monocytes, Absolute 0.47 0.10 - 0.90 10*3/mm3    Eosinophils, Absolute 0.09 0.00 - 0.40 10*3/mm3    Basophils,  Absolute 0.02 0.00 - 0.20 10*3/mm3    Immature Grans, Absolute 0.02 0.00 - 0.05 10*3/mm3    nRBC 0.0 0.0 - 0.2 /100 WBC           MEDICATIONS GIVEN IN ER  Medications   HYDROmorphone (DILAUDID) injection 1 mg (1 mg Intravenous Given 3/10/25 0015)   HYDROmorphone (DILAUDID) injection 1 mg (1 mg Intravenous Given 3/10/25 0143)         ORDERS PLACED DURING THIS VISIT:  Orders Placed This Encounter   Procedures    Comprehensive Metabolic Panel    C-reactive Protein    Sedimentation Rate    CBC Auto Differential    [facility] (on-call MD unless specified) Details    Initiate Observation Status    CBC & Differential         OUTPATIENT MEDICATION MANAGEMENT:  No current Epic-ordered facility-administered medications on file.     No current Epic-ordered outpatient medications on file.         PROGRESS, DATA ANALYSIS, CONSULTS, AND MEDICAL DECISION MAKING  All labs have been independently interpreted by me.  All radiology studies have been reviewed by me. All EKG's have been independently viewed and interpreted by me.  Discussion below represents my analysis of pertinent findings related to patient's condition, differential diagnosis, treatment plan and final disposition.    Differential diagnosis includes but is not limited to osteomyelitis, spinal abscess, myositis.        ED Course as of 03/10/25 0229   Mon Mar 10, 2025   0037 WBC: 8.01 [MP]   0037 Hemoglobin(!): 10.5 [MP]   0037 Sed Rate(!): 77 [MP]   0130 Patient requesting additional dose of pain medication.  Will plan to admit for pain control and updating MRI. [MP]   0227 I spoke with Cecy with [facility].  Discussed patient presentation and ED findings.  She agrees to admit patient to [facility] observation bed to the service of Dr. Mar. [MP]      ED Course User Index  [MP] Holly Barrera PA-C             AS OF 02:29 EDT VITALS:    BP - (!) 132/108  HR - 114  TEMP - 97.2 °F (36.2 °C) (Tympanic)  O2 SATS - 100%    COMPLEXITY OF CARE  The patient requires  admission.      DIAGNOSIS  Final diagnoses:   Intractable back pain   Osteomyelitis of spine         DISPOSITION  ED Disposition       ED Disposition   Decision to Admit    Condition   --    Comment   Level of Care: Med/Surg [1]   Diagnosis: Osteomyelitis of spine [901023]   Admitting Physician: TRINA GUERRERO [[id number]]   Attending Physician: TRINA GUERRERO [[id number]]   Is patient appropriate for Inpatient Observation Unit?: No [0]                  Please note that portions of this document were completed with a voice recognition program.    Note Disclaimer: At [facility], we believe that sharing information builds trust and better relationships. You are receiving this note because you recently visited [facility]. It is possible you will see health information before a provider has talked with you about it. This kind of information can be easy to misunderstand. To help you fully understand what it means for your health, we urge you to discuss this note with your provider.     Holly Barrera PA-C  03/10/25 0229

## 2025-03-10 NOTE — ED PROVIDER NOTES
I have personally performed a face-to-face diagnostic evaluation of the patient.  I have reviewed and agree with the care plan as outlined by NP/PA.  My findings are as follows:    HPI:  Patient is a 44 y.o. female who presents with complaints of worsening back pain.  She has a history of IVDU and subsequent development of related infections including endocarditis, and now lumbar and thoracic epidural abscess, discitis/osteomyelitis and psoas myositis.  She is currently being treated with daptomycin and ceftriaxone till 4/1/2025.  Blood cultures were negative.  She says her biggest concern is an inadequate pain regimen.  She has an appointment upcoming with pain management, but she reports not being pain control in the meantime.  She was offered Suboxone, which does not help her symptoms so she has not taken it in at least a month.  No new numbness or weakness of the legs.  No saddle anesthesia.  No bladder or bowel dysfunction.  She has never had a surgery on her back.      PE:  Physical Exam  Constitutional:       Appearance: Normal appearance.   HENT:      Head: Normocephalic and atraumatic.   Eyes:      Conjunctiva/sclera: Conjunctivae normal.   Pulmonary:      Effort: Pulmonary effort is normal. No respiratory distress.   Musculoskeletal:      Comments: Thoracolumbar spine: Diffuse tenderness to palpation, mostly in the L3 area, gait normal, full strength in the bilateral lower extremities with normal sensation, 2+ DP and PT pulses   Skin:     Comments: PICC line in place right upper extremity   Neurological:      Mental Status: She is alert and oriented to person, place, and time.           MDM:  I provided a substantiate portion of the care of this patient. I personally performed the medical decision making.    Medical records are reviewed in [facility] Care Everywhere, if applicable      Recent Results (from the past 24 hours)   Comprehensive Metabolic Panel    Collection Time: 03/10/25 12:11 AM    Specimen:  Blood   Result Value Ref Range    Glucose 110 (H) 65 - 99 mg/dL    BUN 20 6 - 20 mg/dL    Creatinine 0.71 0.57 - 1.00 mg/dL    Sodium 143 136 - 145 mmol/L    Potassium 3.5 3.5 - 5.2 mmol/L    Chloride 106 98 - 107 mmol/L    CO2 23.7 22.0 - 29.0 mmol/L    Calcium 9.6 8.6 - 10.5 mg/dL    Total Protein 8.2 6.0 - 8.5 g/dL    Albumin 4.2 3.5 - 5.2 g/dL    ALT (SGPT) 9 1 - 33 U/L    AST (SGOT) 16 1 - 32 U/L    Alkaline Phosphatase 57 39 - 117 U/L    Total Bilirubin <0.2 0.0 - 1.2 mg/dL    Globulin 4.0 gm/dL    A/G Ratio 1.1 g/dL    BUN/Creatinine Ratio 28.2 (H) 7.0 - 25.0    Anion Gap 13.3 5.0 - 15.0 mmol/L    eGFR 106.3 >60.0 mL/min/1.73   C-reactive Protein    Collection Time: 03/10/25 12:11 AM    Specimen: Blood   Result Value Ref Range    C-Reactive Protein 5.73 (H) 0.00 - 0.50 mg/dL   Sedimentation Rate    Collection Time: 03/10/25 12:11 AM    Specimen: Blood   Result Value Ref Range    Sed Rate 77 (H) 0 - 20 mm/hr   CBC Auto Differential    Collection Time: 03/10/25 12:11 AM    Specimen: Blood   Result Value Ref Range    WBC 8.01 3.40 - 10.80 10*3/mm3    RBC 3.89 3.77 - 5.28 10*6/mm3    Hemoglobin 10.5 (L) 12.0 - 15.9 g/dL    Hematocrit 32.6 (L) 34.0 - 46.6 %    MCV 83.8 79.0 - 97.0 fL    MCH 27.0 26.6 - 33.0 pg    MCHC 32.2 31.5 - 35.7 g/dL    RDW 14.5 12.3 - 15.4 %    RDW-SD 44.4 37.0 - 54.0 fl    MPV 9.3 6.0 - 12.0 fL    Platelets 303 140 - 450 10*3/mm3    Neutrophil % 66.6 42.7 - 76.0 %    Lymphocyte % 26.0 19.6 - 45.3 %    Monocyte % 5.9 5.0 - 12.0 %    Eosinophil % 1.1 0.3 - 6.2 %    Basophil % 0.2 0.0 - 1.5 %    Immature Grans % 0.2 0.0 - 0.5 %    Neutrophils, Absolute 5.33 1.70 - 7.00 10*3/mm3    Lymphocytes, Absolute 2.08 0.70 - 3.10 10*3/mm3    Monocytes, Absolute 0.47 0.10 - 0.90 10*3/mm3    Eosinophils, Absolute 0.09 0.00 - 0.40 10*3/mm3    Basophils, Absolute 0.02 0.00 - 0.20 10*3/mm3    Immature Grans, Absolute 0.02 0.00 - 0.05 10*3/mm3    nRBC 0.0 0.0 - 0.2 /100 WBC     I have reviewed the above  labs    This is a 44-year-old female with a past medical history of IVDU now clean who is presenting with complaints of intractable back pain.  I reviewed her recent medical records.  She has been evaluated in the outside hospital and is found to have spinal epidural abscess, osteomyelitis and discitis.  She is currently being treated with daptomycin and ceftriaxone.  Her biggest complaint seems to be pain control.  I discussed with her that opioid pain medications may cause a relapse and may not be the best option for her.  However, she understands these risks and wants to proceed.  She says she will only use medication prescribed to her and is committed to her sobriety.    She last had an MRI about 1 month ago.  She did have a biopsy which was culture negative.  Given her worsening pain, may need to consider repeat imaging.  Given her exam demonstrating that she is not acutely threatened in terms of cord compression today, we will plan on admission to further evaluate as inflammatory markers uptrending.    Impression:  Final diagnoses:   Intractable back pain   Osteomyelitis of spine         Disposition:  ED Disposition       ED Disposition   Decision to Admit    Condition   --    Comment   Level of Care: Med/Surg [1]   Diagnosis: Osteomyelitis of spine [541871]   Admitting Physician: TRINA GUERRERO [[id number]]   Attending Physician: TRINA GUERRERO [[id number]]   Is patient appropriate for Inpatient Observation Unit?: No [0]                    Erica Cota MD  03/10/25 0236     2 = A lot of assistance

## 2025-03-11 LAB
ALBUMIN SERPL-MCNC: 3.6 G/DL (ref 3.5–5.2)
ALBUMIN/GLOB SERPL: 1 G/DL
ALP SERPL-CCNC: 41 U/L (ref 39–117)
ALT SERPL W P-5'-P-CCNC: 11 U/L (ref 1–33)
ANION GAP SERPL CALCULATED.3IONS-SCNC: 12.8 MMOL/L (ref 5–15)
AST SERPL-CCNC: 13 U/L (ref 1–32)
BASOPHILS # BLD AUTO: 0.02 10*3/MM3 (ref 0–0.2)
BASOPHILS NFR BLD AUTO: 0.4 % (ref 0–1.5)
BILIRUB SERPL-MCNC: <0.2 MG/DL (ref 0–1.2)
BUN SERPL-MCNC: 9 MG/DL (ref 6–20)
BUN/CREAT SERPL: 18.8 (ref 7–25)
CALCIUM SPEC-SCNC: 9.2 MG/DL (ref 8.6–10.5)
CHLORIDE SERPL-SCNC: 104 MMOL/L (ref 98–107)
CO2 SERPL-SCNC: 24.2 MMOL/L (ref 22–29)
CREAT SERPL-MCNC: 0.48 MG/DL (ref 0.57–1)
DEPRECATED RDW RBC AUTO: 42.8 FL (ref 37–54)
EGFRCR SERPLBLD CKD-EPI 2021: 118.5 ML/MIN/1.73
EOSINOPHIL # BLD AUTO: 0.12 10*3/MM3 (ref 0–0.4)
EOSINOPHIL NFR BLD AUTO: 2.2 % (ref 0.3–6.2)
ERYTHROCYTE [DISTWIDTH] IN BLOOD BY AUTOMATED COUNT: 14.4 % (ref 12.3–15.4)
GLOBULIN UR ELPH-MCNC: 3.7 GM/DL
GLUCOSE SERPL-MCNC: 118 MG/DL (ref 65–99)
HCT VFR BLD AUTO: 32.2 % (ref 34–46.6)
HGB BLD-MCNC: 10.3 G/DL (ref 12–15.9)
IMM GRANULOCYTES # BLD AUTO: 0.02 10*3/MM3 (ref 0–0.05)
IMM GRANULOCYTES NFR BLD AUTO: 0.4 % (ref 0–0.5)
LYMPHOCYTES # BLD AUTO: 1.25 10*3/MM3 (ref 0.7–3.1)
LYMPHOCYTES NFR BLD AUTO: 23.3 % (ref 19.6–45.3)
MCH RBC QN AUTO: 26.5 PG (ref 26.6–33)
MCHC RBC AUTO-ENTMCNC: 32 G/DL (ref 31.5–35.7)
MCV RBC AUTO: 82.8 FL (ref 79–97)
MONOCYTES # BLD AUTO: 0.39 10*3/MM3 (ref 0.1–0.9)
MONOCYTES NFR BLD AUTO: 7.3 % (ref 5–12)
NEUTROPHILS NFR BLD AUTO: 3.57 10*3/MM3 (ref 1.7–7)
NEUTROPHILS NFR BLD AUTO: 66.4 % (ref 42.7–76)
NRBC BLD AUTO-RTO: 0 /100 WBC (ref 0–0.2)
PLATELET # BLD AUTO: 252 10*3/MM3 (ref 140–450)
PMV BLD AUTO: 9.4 FL (ref 6–12)
POTASSIUM SERPL-SCNC: 3.6 MMOL/L (ref 3.5–5.2)
PROT SERPL-MCNC: 7.3 G/DL (ref 6–8.5)
RBC # BLD AUTO: 3.89 10*6/MM3 (ref 3.77–5.28)
SODIUM SERPL-SCNC: 141 MMOL/L (ref 136–145)
WBC NRBC COR # BLD AUTO: 5.37 10*3/MM3 (ref 3.4–10.8)

## 2025-03-11 PROCEDURE — 25010000002 DAPTOMYCIN PER 1 MG: Performed by: NURSE PRACTITIONER

## 2025-03-11 PROCEDURE — 25010000002 CEFTRIAXONE PER 250 MG: Performed by: NURSE PRACTITIONER

## 2025-03-11 PROCEDURE — 85025 COMPLETE CBC W/AUTO DIFF WBC: CPT | Performed by: INTERNAL MEDICINE

## 2025-03-11 PROCEDURE — 80053 COMPREHEN METABOLIC PANEL: CPT | Performed by: INTERNAL MEDICINE

## 2025-03-11 RX ORDER — CYCLOBENZAPRINE HCL 10 MG
10 TABLET ORAL 3 TIMES DAILY
Status: DISCONTINUED | OUTPATIENT
Start: 2025-03-11 | End: 2025-03-13 | Stop reason: HOSPADM

## 2025-03-11 RX ORDER — METOPROLOL SUCCINATE 50 MG/1
50 TABLET, EXTENDED RELEASE ORAL DAILY
Status: DISCONTINUED | OUTPATIENT
Start: 2025-03-11 | End: 2025-03-13 | Stop reason: HOSPADM

## 2025-03-11 RX ORDER — TRAZODONE HYDROCHLORIDE 50 MG/1
50 TABLET ORAL 3 TIMES DAILY
Status: DISCONTINUED | OUTPATIENT
Start: 2025-03-11 | End: 2025-03-13 | Stop reason: HOSPADM

## 2025-03-11 RX ORDER — LIDOCAINE 4 G/G
2 PATCH TOPICAL
Status: DISCONTINUED | OUTPATIENT
Start: 2025-03-11 | End: 2025-03-13 | Stop reason: HOSPADM

## 2025-03-11 RX ORDER — METOPROLOL SUCCINATE 50 MG/1
75 TABLET, EXTENDED RELEASE ORAL DAILY
COMMUNITY

## 2025-03-11 RX ORDER — HYDROCODONE BITARTRATE AND ACETAMINOPHEN 5; 325 MG/1; MG/1
2 TABLET ORAL EVERY 6 HOURS PRN
Status: DISCONTINUED | OUTPATIENT
Start: 2025-03-11 | End: 2025-03-13 | Stop reason: HOSPADM

## 2025-03-11 RX ORDER — TRAZODONE HYDROCHLORIDE 50 MG/1
50 TABLET ORAL 3 TIMES DAILY
COMMUNITY

## 2025-03-11 RX ADMIN — HYDROCODONE BITARTRATE AND ACETAMINOPHEN 2 TABLET: 5; 325 TABLET ORAL at 12:05

## 2025-03-11 RX ADMIN — HYDROCODONE BITARTRATE AND ACETAMINOPHEN 1 TABLET: 5; 325 TABLET ORAL at 08:19

## 2025-03-11 RX ADMIN — CEFTRIAXONE SODIUM 1000 MG: 1 INJECTION, POWDER, FOR SOLUTION INTRAMUSCULAR; INTRAVENOUS at 03:35

## 2025-03-11 RX ADMIN — HYDROCODONE BITARTRATE AND ACETAMINOPHEN 2 TABLET: 5; 325 TABLET ORAL at 20:52

## 2025-03-11 RX ADMIN — TRAZODONE HYDROCHLORIDE 50 MG: 50 TABLET ORAL at 20:52

## 2025-03-11 RX ADMIN — CEFTRIAXONE 1000 MG: 1 INJECTION, POWDER, FOR SOLUTION INTRAMUSCULAR; INTRAVENOUS at 15:32

## 2025-03-11 RX ADMIN — METOPROLOL SUCCINATE 50 MG: 50 TABLET, EXTENDED RELEASE ORAL at 15:29

## 2025-03-11 RX ADMIN — APIXABAN 5 MG: 5 TABLET, FILM COATED ORAL at 20:52

## 2025-03-11 RX ADMIN — CYCLOBENZAPRINE HYDROCHLORIDE 10 MG: 10 TABLET, FILM COATED ORAL at 20:52

## 2025-03-11 RX ADMIN — HYDROCODONE BITARTRATE AND ACETAMINOPHEN 1 TABLET: 5; 325 TABLET ORAL at 03:35

## 2025-03-11 RX ADMIN — APIXABAN 5 MG: 5 TABLET, FILM COATED ORAL at 15:29

## 2025-03-11 RX ADMIN — CYCLOBENZAPRINE HYDROCHLORIDE 10 MG: 10 TABLET, FILM COATED ORAL at 15:29

## 2025-03-11 RX ADMIN — TRAZODONE HYDROCHLORIDE 50 MG: 50 TABLET ORAL at 15:29

## 2025-03-11 RX ADMIN — DAPTOMYCIN 500 MG: 500 INJECTION, POWDER, LYOPHILIZED, FOR SOLUTION INTRAVENOUS at 08:20

## 2025-03-11 RX ADMIN — Medication 10 ML: at 12:06

## 2025-03-11 RX ADMIN — Medication 10 ML: at 20:53

## 2025-03-11 RX ADMIN — ACETAMINOPHEN 650 MG: 325 TABLET, FILM COATED ORAL at 04:39

## 2025-03-11 RX ADMIN — LIDOCAINE 2 PATCH: 4 PATCH TOPICAL at 15:29

## 2025-03-11 NOTE — PROGRESS NOTES
Name: Dawn Young ADMIT: 3/9/2025   : 1980  PCP: Justa Wong APRN    MRN: [id number] LOS: 0 days   AGE/SEX: 44 y.o. female  ROOM: [facility]     Subjective   Subjective   Patient is lying on the bed and continues to complain of back pain.  Denies nausea, vomiting, chest pain, shortness of breath.       Objective   Objective   Vital Signs  Temp:  [97 °F (36.1 °C)-98.1 °F (36.7 °C)] 97.5 °F (36.4 °C)  Heart Rate:  [80-96] 90  Resp:  [18] 18  BP: (127-143)/(78-98) 130/78  SpO2:  [97 %-100 %] 97 %  on   ;   Device (Oxygen Therapy): room air  Body mass index is 24.82 kg/m².  Physical Exam  HEENT: PERRLA, extraocular movements intact, Scleras no icterus  Neck: Supple, no JVD  Cardiovascular: Regular rate and rhythm with normal S1 and S2  Respiratory: Fairly clear to auscultation anteriorly with no wheezes  GI: Soft, nontender, bowel sounds present  EXTR: No edema, palpable pulses  Neurologic: Grossly nonfocal, no facial asymmetry    Results Review     I reviewed the patient's new clinical results.  Results from last 7 days   Lab Units 03/11/25  0513 03/10/25  0606 03/10/25  0011   WBC 10*3/mm3 5.37 7.07 8.01   HEMOGLOBIN g/dL 10.3* 10.5* 10.5*   PLATELETS 10*3/mm3 252 223 303     Results from last 7 days   Lab Units 03/11/25  0513 03/10/25  0606 03/10/25  0011   SODIUM mmol/L 141 139 143   POTASSIUM mmol/L 3.6 3.8 3.5   CHLORIDE mmol/L 104 104 106   CO2 mmol/L 24.2 24.4 23.7   BUN mg/dL 9 16 20   CREATININE mg/dL 0.48* 0.63 0.71   GLUCOSE mg/dL 118* 93 110*   EGFR mL/min/1.73 118.5 111.0 106.3     Results from last 7 days   Lab Units 03/11/25  0513 03/10/25  0606 03/10/25  0011   ALBUMIN g/dL 3.6 4.0 4.2   BILIRUBIN mg/dL <0.2 <0.2 <0.2   ALK PHOS U/L 41 50 57   AST (SGOT) U/L 13 16 16   ALT (SGPT) U/L 11 8 9     Results from last 7 days   Lab Units 25  0513 03/10/25  0606 03/10/25  0011   CALCIUM mg/dL 9.2 9.7 9.6   ALBUMIN g/dL 3.6 4.0 4.2     Results from last 7 days   Lab Units 03/10/25  0606  "  LACTATE mmol/L 1.2     No results found for: \"HGBA1C\", \"POCGLU\"    MRI Lumbar Spine With & Without Contrast  Result Date: 3/10/2025  Again identified is T2 hyperintensity involving the disc at L3-L4 as well as involving the adjacent L3 and L4 vertebral bodies consistent with discitis/osteomyelitis. An area of T2 hyperintensity is appreciated posterior to the vertebral body which doesn't enhance after contrast administration consistent with a phlegmonous collection. However, a small area of nonenhancement is present posterior to the L3 vertebral body which was present previously suspicious for a small abscess. Small areas of nonenhancement are also appreciated involving the lateral aspect of the perivertebral fluid collection to the right/medial aspect of the right psoas muscle which are new versus 12/14/2025 and consistent with abscesses the largest of which measures approximately 13 mm in size. Edema is appreciated involving the medial aspect of the psoas muscles bilaterally, increased slightly as compared to 2/14/2025.   This report was finalized on 3/10/2025 12:46 PM by Dr. Yuval Childress M.D on Workstation: [id number]        I have personally reviewed all medications:  Scheduled Medications  cefTRIAXone, 1,000 mg, Intravenous, Once  [START ON 3/12/2025] cefTRIAXone, 2,000 mg, Intravenous, Q24H  cyclobenzaprine, 10 mg, Oral, TID  [START ON 3/12/2025] DAPTOmycin, 10 mg/kg, Intravenous, Q24H  Lidocaine, 2 patch, Transdermal, Q24H  sodium chloride, 10 mL, Intravenous, Q12H    Infusions   Diet  Diet: Cardiac; Healthy Heart (2-3 Na+); Fluid Consistency: Thin (IDDSI 0)    I have personally reviewed:  [x]  Laboratory   [x]  Microbiology   [x]  Radiology   [x]  EKG/Telemetry  [x]  Cardiology/Vascular   []  Pathology    []  Records       Assessment/Plan     Active Hospital Problems    Diagnosis  POA    **Osteomyelitis of spine [M46.20]  Yes    Osteomyelitis [M86.9]  Yes    Epidural abscess [G06.2]  Unknown    " Hepatitis B [B19.10]  Yes    H/O mitral valve replacement [Z95.2]  Not Applicable    IVDU (intravenous drug user) [F19.90]  Yes      Resolved Hospital Problems   No resolved problems to display.       44 y.o. female admitted with Osteomyelitis of spine.       1.Lumbar  spine discitis/epidural abscess, was on daptomycin and Rocephin prior to this hospitalization which will be continued.  Stop date is 4/1.  MRI findings suggestive of discitis/abscess and neurosurgery consult will be obtained as well.    2.  History of IV drug use, states she has been abstinent since past one year.    3.  History of mitral valve replacement in October secondary to endocarditis, bioprosthetic per patient.    4.  History of hepatitis B, advised her to follow-up with GI as an outpatient basis.    5.  On SCDs for DVT prophylaxis.    6.  CODE STATUS is full code.    7.  Estimated discharge date, to be determined.     Copy text on this note has been reviewed by me on 3/11/2025        Remberto Richard MD  [facility]  03/11/25  13:47 EDT

## 2025-03-11 NOTE — CONSULTS
[facility] NEUROSURGERY CONSULT NOTE    Patient name: Dawn Young  Referring Provider: EMMA  Reason for Consultation: MRI results    Patient Care Team:  Justa Wong APRN as PCP - General (Nurse Practitioner)  Provider, No Known as PCP - Family Medicine    Chief complaint: worsening back pain    Subjective .     History of present illness:    Patient is a 44 y.o.  female with history of IV drug use (heroin, has not used in over a year), hep B, COPD, CHF, MVR, and currently receiving antibiotics via PICC for osteomyelitis/discitis at L3-4 w/ epidural abscess and psoas abscess treated at [facility] last month. She presents for worsening low back pain uncontrolled with her home medication.  She says her pain has been fairly well-controlled until recently and she is concerned that infection is getting worse.  She denies any leg symptoms but states it does hurt her back to move her legs at times. Denies any fevers or chills.     Review of Systems  Review of Systems   Constitutional:  Negative for chills and fever.   Musculoskeletal:  Positive for back pain. Negative for gait problem.   Neurological:  Negative for weakness and numbness.       History  PAST MEDICAL HISTORY  History reviewed. No pertinent past medical history.    PAST SURGICAL HISTORY  Past Surgical History:   Procedure Laterality Date    CARDIAC VALVE REPLACEMENT      DILATATION AND CURETTAGE      TUBAL ABDOMINAL LIGATION         FAMILY HISTORY  History reviewed. No pertinent family history.    SOCIAL HISTORY  Social History     Tobacco Use    Smoking status: Every Day     Current packs/day: 2.00     Types: Cigarettes   Vaping Use    Vaping status: Never Used   Substance Use Topics    Alcohol use: Not Currently    Drug use: Yes     Types: Marijuana     Comment: heroin occasionally          Allergies:  Toradol [ketorolac tromethamine] and Zofran [ondansetron]    MEDICATIONS:  Medications Prior to Admission   Medication Sig Dispense Refill Last Dose/Taking     apixaban (ELIQUIS) 5 MG tablet tablet Take 1 tablet by mouth Every 12 (Twelve) Hours.   Taking    metoprolol succinate XL (TOPROL-XL) 50 MG 24 hr tablet Take 1 tablet by mouth Daily. Take one and a half   Taking    tiZANidine (ZANAFLEX) 4 MG tablet Take 1 tablet by mouth Every 8 (Eight) Hours As Needed for Muscle Spasms.   Taking As Needed    traZODone (DESYREL) 50 MG tablet Take 1 tablet by mouth 3 (Three) Times a Day.   Taking         Current Facility-Administered Medications:     acetaminophen (TYLENOL) tablet 650 mg, 650 mg, Oral, Q4H PRN, 650 mg at 03/11/25 0439 **OR** acetaminophen (TYLENOL) 160 MG/5ML oral solution 650 mg, 650 mg, Oral, Q4H PRN **OR** acetaminophen (TYLENOL) suppository 650 mg, 650 mg, Rectal, Q4H PRN, Cecy Allen APRN    sennosides-docusate (PERICOLACE) 8.6-50 MG per tablet 2 tablet, 2 tablet, Oral, BID PRN **AND** polyethylene glycol (MIRALAX) packet 17 g, 17 g, Oral, Daily PRN **AND** bisacodyl (DULCOLAX) EC tablet 5 mg, 5 mg, Oral, Daily PRN **AND** bisacodyl (DULCOLAX) suppository 10 mg, 10 mg, Rectal, Daily PRN, Cecy Allen APRN    cefTRIAXone (ROCEPHIN) 1,000 mg in sodium chloride 0.9 % 100 mL MBP, 1,000 mg, Intravenous, Once, Cecy Allen APRN    [START ON 3/12/2025] cefTRIAXone (ROCEPHIN) 2,000 mg in sodium chloride 0.9 % 100 mL MBP, 2,000 mg, Intravenous, Q24H, Cecy Allen APRN    cyclobenzaprine (FLEXERIL) tablet 10 mg, 10 mg, Oral, TID, Diane Hanson APRN    [START ON 3/12/2025] DAPTOmycin (CUBICIN) 650 mg in sodium chloride 0.9 % 50 mL IVPB, 10 mg/kg, Intravenous, Q24H, Cecy Allen APRN    famotidine (PEPCID) tablet 20 mg, 20 mg, Oral, BID PRN, Cecy Allen APRN    HYDROcodone-acetaminophen (NORCO) 5-325 MG per tablet 2 tablet, 2 tablet, Oral, Q6H PRN, Remberto Richard MD, 2 tablet at 03/11/25 1205    Lidocaine 4 % 2 patch, 2 patch, Transdermal, Q24H, Diane Hanson APRN    melatonin tablet 5 mg, 5 mg,  Oral, Nightly PRN, Cecy Allen M, APRN    nitroglycerin (NITROSTAT) SL tablet 0.4 mg, 0.4 mg, Sublingual, Q5 Min PRN, Cecy Allen M, APRN    sodium chloride 0.9 % flush 10 mL, 10 mL, Intravenous, Q12H, AlejandroAlonsoie M, APRN, 10 mL at 03/11/25 1206    sodium chloride 0.9 % flush 10 mL, 10 mL, Intravenous, PRN, Cecy Allen M, APRN    sodium chloride 0.9 % infusion 40 mL, 40 mL, Intravenous, PRN, AlejandroCecy lord M, APRN    COMORBID CONDITIONS:  COPD, Heart failure, and Hep B, MVR, and IV drug use    Objective     Results Review:  LABS:  Results from last 7 days   Lab Units 03/11/25  0513 03/10/25  0606 03/10/25  0011   WBC 10*3/mm3 5.37 7.07 8.01   HEMOGLOBIN g/dL 10.3* 10.5* 10.5*   HEMATOCRIT % 32.2* 32.3* 32.6*   PLATELETS 10*3/mm3 252 223 303     Results from last 7 days   Lab Units 03/11/25  0513 03/10/25  0606 03/10/25  0011   SODIUM mmol/L 141 139 143   POTASSIUM mmol/L 3.6 3.8 3.5   CHLORIDE mmol/L 104 104 106   CO2 mmol/L 24.2 24.4 23.7   BUN mg/dL 9 16 20   CREATININE mg/dL 0.48* 0.63 0.71   CALCIUM mg/dL 9.2 9.7 9.6   BILIRUBIN mg/dL <0.2 <0.2 <0.2   ALK PHOS U/L 41 50 57   ALT (SGPT) U/L 11 8 9   AST (SGOT) U/L 13 16 16   GLUCOSE mg/dL 118* 93 110*         DIAGNOSTICS:  Lumbar MRI w/ & w/o: Again identified is T2 hyperintensity involving the disc at  L3-L4 as well as involving the adjacent L3 and L4 vertebral bodies  consistent with discitis/osteomyelitis. An area of T2 hyperintensity is  appreciated posterior to the vertebral body which doesn't enhance after  contrast administration consistent with a phlegmonous collection.  However, a small area of nonenhancement is present posterior to the L3  vertebral body which was present previously suspicious for a small  abscess. Small areas of nonenhancement are also appreciated involving  the lateral aspect of the perivertebral fluid collection to the  right/medial aspect of the right psoas muscle which are new versus  12/14/2025 and  consistent with abscesses the largest of which measures  approximately 13 mm in size. Edema is appreciated involving the medial  aspect of the psoas muscles bilaterally, increased slightly as compared  to 2/14/2025.    Results Review:   I reviewed the patient's new clinical results.  I personally viewed and interpreted the patient's Chart, labs, outside records, medications, and imaging has been reviewed by and discussed with Dr. Barakat     Vital Signs   Temp:  [97 °F (36.1 °C)-98.1 °F (36.7 °C)] 97.5 °F (36.4 °C)  Heart Rate:  [80-96] 90  Resp:  [18] 18  BP: (127-143)/(78-98) 130/78    Physical Exam:  Physical Exam  Vitals reviewed.   Constitutional:       Appearance: Normal appearance. She is not ill-appearing.   Pulmonary:      Effort: Pulmonary effort is normal.   Musculoskeletal:      Comments: Mild tenderness to thoracolumbar spine and right paraspinal pain   Skin:     General: Skin is warm and dry.   Neurological:      Mental Status: She is alert.      Motor: Motor strength is normal.     Deep Tendon Reflexes:      Reflex Scores:       Patellar reflexes are 2+ on the right side and 2+ on the left side.       Achilles reflexes are 2+ on the right side and 2+ on the left side.  Psychiatric:         Speech: Speech normal.       Neurological Exam  Mental Status  Alert. Oriented to person, place, time and situation. Speech is normal. Attention and concentration are normal.    Motor  Normal muscle bulk throughout. Normal muscle tone. Strength is 5/5 throughout all four extremities.    Sensory  Sensation is intact to light touch, pinprick, vibration and proprioception in all four extremities.    Reflexes                                            Right                      Left  Patellar                                2+                         2+  Achilles                                2+                         2+    Gait    Not observed but moving in bed without issue. No issues ambulating per patient and  "RN.      Assessment & Plan       Osteomyelitis of spine    Hepatitis B    IVDU (intravenous drug user)    Osteomyelitis    Epidural abscess    H/O mitral valve replacement      Problem List Items Addressed This Visit       * (Principal) Osteomyelitis of spine    Relevant Medications    cefTRIAXone (ROCEPHIN) 2,000 mg in sodium chloride 0.9 % 100 mL MBP (Start on 3/12/2025  3:30 AM)    cefTRIAXone (ROCEPHIN) 1,000 mg in sodium chloride 0.9 % 100 mL MBP (Start on 3/11/2025  1:45 PM)     Other Visit Diagnoses         Intractable back pain    -  Primary      Follow-up exam        Relevant Orders    MRI Outside Films (Completed)           Pleasant 44-year-old female with prior history of IV drug use, known lumbar infection with epidural and psoas abscess presents with uncontrolled back pain.  She diagnosed with osteomyelitis/discitis at [facility] and is being treated with daptomycin and Rocephin until April 1 via PICC.  MRI performed here shows no evidence of worsening osteomyelitis or discitis at L3-4 or new or worsening epidural abscess.  There are some disc changes more apparent on our MRI compared to prior but this is expected and not concerning.  No indication for surgical intervention.  She is afebrile and WBC is normal, though her sed rate has increased some. Recommend ID for any concerns of worsening psoas abscess.      I suspect that she has developed a tolerance for her Norco 5 and is now requiring more for pain control.  I will add lidocaine patches and Flexeril but will defer any other agents to the primary team. At one point she expressed a lot of frustration and defeat at level of pain and feared she was being perceived as drug seeking. At one point she stated that she is tempted to stop with the PICC and \"be done\". I specifically asked her if she intended to use her PICC for illicit drugs or would use heroin again. She denied and stated that she knows she would die if she used heroin again.     Recommend that " "she continue her antibiotics and follow-up with [facility] for lumbar infection.  No further recommendations at this time.  Please feel free to contact us with any questions or concerns.    PLAN:     Continue antibiotics  Added lidocaine patches and flexeril  Pain control  Follow-up with [facility]    I discussed the patient's findings and my recommendations with patient, nursing staff, and Dr. Barakat    During patient visit, I utilized appropriate personal protective equipment including gloves and mask.  Mask used was standard procedure mask. Appropriate PPE was worn during the entire visit.  Hand hygiene was completed before and after.     Diane Hanson, APRN  03/11/25  13:22 EDT    \"Dictated utilizing Dragon dictation\".     "

## 2025-03-11 NOTE — DISCHARGE PLACEMENT REQUEST
"Dawn Young (44 y.o. Female)       Date of Birth   1980    Social Security Number       Address   [address] [place] [address]    Home Phone   [phone]    MRN   [id number]       Zoroastrian   None    Marital Status                               Admission Date   3/9/2025    Admission Type   Emergency    Admitting Provider   Remberto Richard MD    Attending Provider   Remberto Richard MD    Department, Room/Bed   [address], [address]       Discharge Date       Discharge Disposition       Discharge Destination                                 Attending Provider: Remberto Richard MD    Allergies: Toradol [Ketorolac Tromethamine], Zofran [Ondansetron]    Isolation: None   Infection: None   Code Status: CPR    Ht: 157.5 cm (62\")   Wt: 61.6 kg (135 lb 11.2 oz)    Admission Cmt: None   Principal Problem: Osteomyelitis of spine [M46.20]                   Active Insurance as of 3/9/2025       Primary Coverage       Payor Plan Insurance Group Employer/Plan Group    AETNA MEDICARE REPLACEMENT AETNA MEDICARE REPLACEMENT [id number]       Payor Plan Address Payor Plan Phone Number Payor Plan Fax Number Effective Dates    [address] [phone]  1/1/2024 - None Entered    [facility] [id number]         Subscriber Name Subscriber Birth Date Member ID       DAWN YOUNG 1980 [id number]                     Emergency Contacts        (Rel.) Home Phone Work Phone Mobile Phone    SAMMY BARRIENTOS (Daughter) [phone] -- [phone]    Karen Barrientos (Daughter) -- -- [phone]                "

## 2025-03-11 NOTE — PLAN OF CARE
Goal Outcome Evaluation:              Outcome Evaluation: Pt's vital signs stable, room air, nsr on the monitor. prn norco and tylenol given for pain. call light within reach. safety precautions maintained.

## 2025-03-11 NOTE — CASE MANAGEMENT/SOCIAL WORK
Discharge Planning Assessment  [facility]     Patient Name: Dawn Young  MRN: [id number]  Today's Date: 3/11/2025    Admit Date: 3/9/2025    Plan: Home, family to transport. [facility] for IV Abx.   Discharge Needs Assessment       Row Name 03/11/25 0849       Living Environment    People in Home alone    Current Living Arrangements apartment    Family Caregiver if Needed child(elmer), adult;significant other    Quality of Family Relationships helpful;involved;supportive    Able to Return to Prior Arrangements yes       Transition Planning    Patient/Family Anticipates Transition to home with help/services    Patient/Family Anticipated Services at Transition     Transportation Anticipated family or friend will provide       Discharge Needs Assessment    Readmission Within the Last 30 Days no previous admission in last 30 days    Current Outpatient/Agency/Support Group infusion therapy, home    Equipment Currently Used at Home none    Concerns to be Addressed discharge planning    Equipment Needed After Discharge none    Outpatient/Agency/Support Group Needs infusion therapy, home                   Discharge Plan       Row Name 03/11/25 0849       Plan    Plan Home, family to transport. [facility] for IV Abx.    Patient/Family in Agreement with Plan yes    Plan Comments CCP met with pt at the bedside, introduced self and role of CCP. Face sheet information and pharmacy verified. Pt lives in a 2nd floor apartment alone. Pt does not drive, IADL's and denies DME at home. Pt denies having a living will. Pt declines meds to beds and denies trouble affording or managing her medications. Pt denies HH or SNF history. Pt stated she is current with [facility] for IV Abx and has been on them since October. Pt stated [facility] draws labs and changes her dressing every Sunday but she manages the IV antibiotics thru the week. LVM for [facility] to confirm pt is current. DC plan is home, family or [employer] to  transport. Shon ZARAGOZA/CCP                  Continued Care and Services - Admitted Since 3/9/2025       Dialysis/Infusion       Service Provider Request Status Services Address Phone Fax Patient Preferred    [facility] Pending - Request Sent -- [phone] [address] [phone] --                  Expected Discharge Date and Time       Expected Discharge Date Expected Discharge Time    Mar 12, 2025            Demographic Summary       Row Name 03/11/25 0848       General Information    Admission Type inpatient    Arrived From home    Required Notices Provided Important Message from Medicare    Referral Source admission list;case finding    Reason for Consult discharge planning    Preferred Language English       Contact Information    Permission Granted to Share Info With                    Functional Status       Row Name 03/11/25 0848       Functional Status    Usual Activity Tolerance good    Current Activity Tolerance moderate       Assessment of Health Literacy    How often do you have someone help you read hospital materials? Never    How often do you have problems learning about your medical condition because of difficulty understanding written information? Never    How often do you have a problem understanding what is told to you about your medical condition? Never    How confident are you filling out medical forms by yourself? Extremely    Health Literacy Good       Functional Status, IADL    Medications independent    Meal Preparation independent    Housekeeping independent    Laundry independent    Shopping independent                               Evelina Bullard, MILA

## 2025-03-11 NOTE — PAYOR COMM NOTE
"Dawn Young (44 y.o. Female)      SEE FOR INPATIENT:  ref# [id number]     UR: FAX: [phone]    [phone]    [facility]: I [id number]  [facility]#  [id number]    MATILDE GONZALEZ RN,CCP       Date of Birth   1980    Social Security Number       Address   [address] [place] [address] [address] [place] [address]    Home Phone   [phone]    MRN   [id number]       Yazidi   None    Marital Status                               Admission Date   3/9/2025    Admission Type   Emergency    Admitting Provider   Remberto Richard MD    Attending Provider   Remberto Richard MD    Department, Room/Bed   [address], S417/1       Discharge Date       Discharge Disposition       Discharge Destination                                 Attending Provider: Remberto Richard MD    Allergies: Toradol [Ketorolac Tromethamine], Zofran [Ondansetron]    Isolation: None   Infection: None   Code Status: CPR    Ht: 157.5 cm (62\")   Wt: 61.6 kg (135 lb 11.2 oz)    Admission Cmt: None   Principal Problem: Osteomyelitis of spine [M46.20]                   Active Insurance as of 3/9/2025       Primary Coverage       Payor Plan Insurance Group Employer/Plan Group    AETNA MEDICARE REPLACEMENT AETNA MEDICARE REPLACEMENT [id number]       Payor Plan Address Payor Plan Phone Number Payor Plan Fax Number Effective Dates    [address] [phone]  1/1/2024 - None Entered    [facility] [id number]         Subscriber Name Subscriber Birth Date Member ID       DAWN YOUNG 1980 [id number]                     Emergency Contacts        (Rel.) Home Phone Work Phone Mobile Phone    SAMMY BRARIENTOS (Daughter) [phone] -- [phone]    Karen Barrientos (Daughter) -- -- [phone]                 History & Physical        Remberto Richard MD at 03/10/25 1313              Patient Name:  Dawn Young  YOB: 1980  MRN:  [id number]  Admit " Date:  3/9/2025  Patient Care Team:  Provider, No Known as PCP - General  Provider, No Known as PCP - Family Medicine      Subjective  History Present Illness     Chief Complaint   Patient presents with    Abscess     Back pain     Patient is a 44-year-old female with known history of IV drug use, mitral valve replacement, epidural abscess, hepatitis B was diagnosed with epidural abscess in February at [facility] and subsequently was discharged home with a PICC line and is on daptomycin along with IV Rocephin to complete a course on 4/1/25.  Has been doing reasonably well however has been complaining of worsening low back pain and therefore decided to come to the emergency room.  Denies any pain radiating down her buttocks and denies any urinary or bowel incontinence.  Denies any fevers or chills.  Given the above patient is being hospitalized for further evaluation.  Patient denies any chest pain, shortness of breath, palpitations, dizziness, numbness, tingling, one-sided weakness.  States her last drug use was approximately a year ago.      Review of Systems   A 12 system review has been performed and they are negative other than mentioned in the H&P.    Personal History     History reviewed. No pertinent past medical history.  Past Surgical History:   Procedure Laterality Date    CARDIAC VALVE REPLACEMENT      DILATATION AND CURETTAGE      TUBAL ABDOMINAL LIGATION       History reviewed. No pertinent family history.  Social History     Tobacco Use    Smoking status: Every Day     Current packs/day: 2.00     Types: Cigarettes   Vaping Use    Vaping status: Never Used   Substance Use Topics    Alcohol use: Not Currently    Drug use: Yes     Types: Marijuana     Comment: heroin occasionally      No current facility-administered medications on file prior to encounter.     No current outpatient medications on file prior to encounter.     Allergies   Allergen Reactions    Toradol [Ketorolac Tromethamine] Hives    Zofran  [Ondansetron] Rash       Objective   Objective     Vital Signs  Temp:  [97.2 °F (36.2 °C)-98.6 °F (37 °C)] 98.6 °F (37 °C)  Heart Rate:  [102-114] 102  Resp:  [18-22] 20  BP: (112-164)/() 112/72  SpO2:  [96 %-100 %] 96 %  on   ;   Device (Oxygen Therapy): room air  Body mass index is 24.82 kg/m².    Physical Exam  HEENT: PERRLA, extraocular movements intact, Scleras no icterus  Neck: Supple, no JVD  Cardiovascular: Regular rate and rhythm with normal S1 and S2  Respiratory: Fairly clear to auscultation anteriorly with no wheezes  GI: Soft, nontender, bowel sounds present  EXTR: No edema, palpable pulses  Neurologic: Grossly nonfocal, no facial asymmetry    Results Review:  I reviewed the patient's new clinical results.  I reviewed the patient's new imaging results and agree with the interpretation.  I reviewed the patient's other test results and agree with the interpretation  I personally viewed and interpreted the patient's EKG/Telemetry data  Discussed with ED provider.    Lab Results (last 24 hours)       Procedure Component Value Units Date/Time    CBC & Differential [[id number]]  (Abnormal) Collected: 03/10/25 0011    Specimen: Blood Updated: 03/10/25 0020    Narrative:      The following orders were created for panel order CBC & Differential.  Procedure                               Abnormality         Status                     ---------                               -----------         ------                     CBC Auto Differential[[id number]]        Abnormal            Final result                 Please view results for these tests on the individual orders.    Comprehensive Metabolic Panel [[id number]]  (Abnormal) Collected: 03/10/25 0011    Specimen: Blood Updated: 03/10/25 0113     Glucose 110 mg/dL      BUN 20 mg/dL      Creatinine 0.71 mg/dL      Sodium 143 mmol/L      Potassium 3.5 mmol/L      Chloride 106 mmol/L      CO2 23.7 mmol/L      Calcium 9.6 mg/dL      Total Protein 8.2 g/dL       Albumin 4.2 g/dL      ALT (SGPT) 9 U/L      AST (SGOT) 16 U/L      Alkaline Phosphatase 57 U/L      Total Bilirubin <0.2 mg/dL      Globulin 4.0 gm/dL      A/G Ratio 1.1 g/dL      BUN/Creatinine Ratio 28.2     Anion Gap 13.3 mmol/L      eGFR 106.3 mL/min/1.73     Narrative:      GFR Categories in Chronic Kidney Disease (CKD)      GFR Category          GFR (mL/min/1.73)    Interpretation  G1                     90 or greater         Normal or high (1)  G2                      60-89                Mild decrease (1)  G3a                   45-59                Mild to moderate decrease  G3b                   30-44                Moderate to severe decrease  G4                    15-29                Severe decrease  G5                    14 or less           Kidney failure          (1)In the absence of evidence of kidney disease, neither GFR category G1 or G2 fulfill the criteria for CKD.    eGFR calculation 2021 CKD-EPI creatinine equation, which does not include race as a factor    C-reactive Protein [[id number]]  (Abnormal) Collected: 03/10/25 0011    Specimen: Blood Updated: 03/10/25 0113     C-Reactive Protein 5.73 mg/dL     Sedimentation Rate [[id number]]  (Abnormal) Collected: 03/10/25 0011    Specimen: Blood Updated: 03/10/25 0031     Sed Rate 77 mm/hr     CBC Auto Differential [[id number]]  (Abnormal) Collected: 03/10/25 0011    Specimen: Blood Updated: 03/10/25 0020     WBC 8.01 10*3/mm3      RBC 3.89 10*6/mm3      Hemoglobin 10.5 g/dL      Hematocrit 32.6 %      MCV 83.8 fL      MCH 27.0 pg      MCHC 32.2 g/dL      RDW 14.5 %      RDW-SD 44.4 fl      MPV 9.3 fL      Platelets 303 10*3/mm3      Neutrophil % 66.6 %      Lymphocyte % 26.0 %      Monocyte % 5.9 %      Eosinophil % 1.1 %      Basophil % 0.2 %      Immature Grans % 0.2 %      Neutrophils, Absolute 5.33 10*3/mm3      Lymphocytes, Absolute 2.08 10*3/mm3      Monocytes, Absolute 0.47 10*3/mm3      Eosinophils, Absolute 0.09 10*3/mm3      Basophils,  Absolute 0.02 10*3/mm3      Immature Grans, Absolute 0.02 10*3/mm3      nRBC 0.0 /100 WBC     CBC (No Diff) [[id number]]  (Abnormal) Collected: 03/10/25 0606    Specimen: Blood Updated: 03/10/25 0621     WBC 7.07 10*3/mm3      RBC 3.89 10*6/mm3      Hemoglobin 10.5 g/dL      Hematocrit 32.3 %      MCV 83.0 fL      MCH 27.0 pg      MCHC 32.5 g/dL      RDW 14.4 %      RDW-SD 43.2 fl      MPV 9.7 fL      Platelets 223 10*3/mm3     Comprehensive Metabolic Panel [[id number]]  (Abnormal) Collected: 03/10/25 0606    Specimen: Blood Updated: 03/10/25 0638     Glucose 93 mg/dL      BUN 16 mg/dL      Creatinine 0.63 mg/dL      Sodium 139 mmol/L      Potassium 3.8 mmol/L      Chloride 104 mmol/L      CO2 24.4 mmol/L      Calcium 9.7 mg/dL      Total Protein 8.1 g/dL      Albumin 4.0 g/dL      ALT (SGPT) 8 U/L      AST (SGOT) 16 U/L      Alkaline Phosphatase 50 U/L      Total Bilirubin <0.2 mg/dL      Globulin 4.1 gm/dL      A/G Ratio 1.0 g/dL      BUN/Creatinine Ratio 25.4     Anion Gap 10.6 mmol/L      eGFR 111.0 mL/min/1.73     Narrative:      GFR Categories in Chronic Kidney Disease (CKD)      GFR Category          GFR (mL/min/1.73)    Interpretation  G1                     90 or greater         Normal or high (1)  G2                      60-89                Mild decrease (1)  G3a                   45-59                Mild to moderate decrease  G3b                   30-44                Moderate to severe decrease  G4                    15-29                Severe decrease  G5                    14 or less           Kidney failure          (1)In the absence of evidence of kidney disease, neither GFR category G1 or G2 fulfill the criteria for CKD.    eGFR calculation 2021 CKD-EPI creatinine equation, which does not include race as a factor    Lactic Acid, Plasma [[id number]]  (Normal) Collected: 03/10/25 0606    Specimen: Blood Updated: 03/10/25 0638     Lactate 1.2 mmol/L             Imaging Results (Last 24 Hours)        Procedure Component Value Units Date/Time    MRI Lumbar Spine With & Without Contrast [[id number]] Collected: 03/10/25 1212     Updated: 03/10/25 1249    Narrative:      MRI LUMBAR SPINE W WO CONTRAST-     HISTORY:  increasing back pain, known abscess; M54.9-Dorsalgia,  unspecified; M46.20-Osteomyelitis of vertebra, site unspecified;  [id number] for follow-up examination after completed treatment for  conditions other than malignant neoplasm     COMPARISON: MRI lumbar spine 2/14/2025     FINDINGS: Mild levoscoliosis of the lumbar spine is appreciated with the  apex at the level of L3. Grade 1 anterolisthesis of L3 upon L4 is  appreciated estimated be 2 mm similar in appearance as compared to the  prior examination. T2 hyperintensity is appreciated involving the L3-L4  disc and there is edema involving the L3 and L4 vertebral bodies  consistent with discitis/osteomyelitis.     T12-L1: There is no evidence of a disc bulge or herniation.     L1-L2: There is no evidence of disc bulge or herniation.     L2-L3: There is no evidence of disc bulge or herniation.     L3-L4: There is an area of T2 hyperintensity posterior to the L3  vertebral body extending from the L3-L4 disc space to the mid body of  L3. This was present previously. After contrast administration central  areas of nonenhancement are appreciated which were also present  previously. The axial T2 sequences demonstrates a circumferential rim of  T2 anterior, anterolateral and lateral to the disc which was present  previously but is more prominent as compared to the prior examination.  Areas of nonenhancement are appreciated involving the lateral aspects of  the fluid collections on the right and involving the medial aspects of  the psoas muscles suggesting abscesses the largest of which measures  approximately 13 mm in the maximum transverse dimension. These are new  versus 2/14/2025. Edema is appreciated involving the medial aspects of  the psoas muscles  bilaterally, increased versus 2/14/2025. Mild  foraminal stenosis is present on the left and there is severe foraminal  stenosis on the right secondary to extension of disc material as well as  fluid into the neural foramen. Foraminal stenosis appears similar to  2/14/2015.     L4-L5: Mild facet degenerative disease is present bilaterally. A mild  central disc osteophyte complex is present resulting in mild flattening  of the ventral surface of the thecal sac. Mild foraminal stenosis is  present bilaterally secondary to extension of the disc osteophyte  complex into the neural foramen.     L5-S1: Mild facet degenerative disease is present bilaterally. A central  disc osteophyte complex is present resulting in mild flattening of the  ventral surface of the thecal sac. Mild foraminal stenosis is present  bilaterally secondary to extension of the small disc osteophyte complex  into the neural foramen.       Impression:      Again identified is T2 hyperintensity involving the disc at  L3-L4 as well as involving the adjacent L3 and L4 vertebral bodies  consistent with discitis/osteomyelitis. An area of T2 hyperintensity is  appreciated posterior to the vertebral body which doesn't enhance after  contrast administration consistent with a phlegmonous collection.  However, a small area of nonenhancement is present posterior to the L3  vertebral body which was present previously suspicious for a small  abscess. Small areas of nonenhancement are also appreciated involving  the lateral aspect of the perivertebral fluid collection to the  right/medial aspect of the right psoas muscle which are new versus  12/14/2025 and consistent with abscesses the largest of which measures  approximately 13 mm in size. Edema is appreciated involving the medial  aspect of the psoas muscles bilaterally, increased slightly as compared  to 2/14/2025.        This report was finalized on 3/10/2025 12:46 PM by Dr. Yuval Childress M.D on Workstation:  [id number]       MRI Outside Films [[id number]] Resulted: 03/10/25 1022     Updated: 03/10/25 1022    Narrative:      This procedure was auto-finalized with no dictation required.    MRI Outside Films [[id number]] Resulted: 03/10/25 1021     Updated: 03/10/25 1021    Narrative:      This procedure was auto-finalized with no dictation required.    XR Chest Post CVA Port [[id number]] Collected: 03/10/25 0622     Updated: 03/10/25 0627    Narrative:      XR CHEST POST CVA PORT-     Clinical: PICC line placement     COMPARISON examinations dated 7/21/2021     FINDINGS: Right upper extremity PICC line tip is near the junction of  the superior vena cava and right atrium. If placement within the mid SVC  is desired, the catheter can be pulled back 3-4 cm. Cardiac size within  normal limits. There are median sternotomy wires and vascular markers in  position as before. No effusion, edema or acute airspace disease has  developed.     The remainder is unremarkable.     This report was finalized on 3/10/2025 6:24 AM by Dr. Samuel Lomax M.D  on Workstation: [id number]                   No orders to display        Assessment/Plan     Active Hospital Problems    Diagnosis  POA    **Osteomyelitis of spine [M46.20]  Yes    Osteomyelitis [M86.9]  Yes      Resolved Hospital Problems   No resolved problems to display.       1.Epidural abscess, was on daptomycin and Rocephin prior to this hospitalization which will be continued.  Stop date is 4/1.  Was given worsening back pain MRI has been ordered and the results are pending.  2.  History of IV drug use, states she has been abstinent since near now.  3.  History of mitral valve replacement, bioprosthetic per patient.  4.  History of hepatitis B, advised her to follow-up with GI as an outpatient basis.  5.  On SCDs for DVT prophylaxis.  6.  CODE STATUS is full code.       Remberto Richard MD  [facility]  03/10/25  13:13 EDT      Electronically signed by  Remberto Richard MD at 03/10/25 1316          Emergency Department Notes            Holly Barrera PA-C at 03/10/25 0013       Attestation signed by Erica Cota MD at 03/10/25 0326        SHARED APC FACE TO FACE: I performed a substantive part of the MDM during the patient's E/M visit. I personally evaluated and examined the patient. I personally made or approved the documented management plan and acknowledge its risk of complications.   Erica Cota MD 3/10/2025 03:26 EDT                          EMERGENCY DEPARTMENT ENCOUNTER  Room Number:  05/05  PCP: Provider, No Known  Independent Historians: Patient      HPI:  Chief Complaint: had concerns including Abscess (Back pain).     A complete HPI/ROS/PMH/PSH/SH/FH are unobtainable due to: None    Chronic or social conditions impacting patient care (Social Determinants of Health): None      Context: Dawn Young is a 44 y.o. female with a medical history of IVDU, heart failure, endocarditis, mitral valve replacement who presents to the ED c/o acute back pain.  Patient reports she has been dealing with endocarditis, thoracic and lumbar epidural abscesses, and discitis osteomyelitis.  Is currently on Rocephin and daptomycin.  Reports she has been on Suboxone but has not been taking this for approximately 1 month.  She presents today primarily complaining of persistent back pain.  States she has an upcoming appointment with pain management.  No recent fall or back injury.  Has had no prior back surgeries.  She denies bowel/bladder incontinence, saddle anesthesia, or numbness/weakness of the extremities.  No other systemic complaints at this time.      Review of prior external notes (non-ED) -and- Review of prior external test results outside of this encounter:  Patient seen in office by infectious disease on 3/6/2025 for osteomyelitis.  Reviewed assessment and plan.  Patient will continue ceftriaxone and daptomycin through 4/1/2025.  Reviewed labs  collected yesterday.  CBC with hemoglobin 11, CMP with creatinine 0.51.    Prescription drug monitoring program review:     N/A    PAST MEDICAL HISTORY  Active Ambulatory Problems     Diagnosis Date Noted    No Active Ambulatory Problems     Resolved Ambulatory Problems     Diagnosis Date Noted    No Resolved Ambulatory Problems     No Additional Past Medical History         PAST SURGICAL HISTORY  Past Surgical History:   Procedure Laterality Date    CARDIAC VALVE REPLACEMENT      DILATATION AND CURETTAGE      TUBAL ABDOMINAL LIGATION           FAMILY HISTORY  History reviewed. No pertinent family history.      SOCIAL HISTORY  Social History     Socioeconomic History    Marital status:    Tobacco Use    Smoking status: Every Day     Current packs/day: 2.00     Types: Cigarettes   Substance and Sexual Activity    Alcohol use: Not Currently    Drug use: Yes     Types: Marijuana     Comment: heroin occasionally          ALLERGIES  Toradol [ketorolac tromethamine] and Zofran [ondansetron]      REVIEW OF SYSTEMS  Included in HPI  All systems reviewed and negative except for those discussed in HPI.      PHYSICAL EXAM    I have reviewed the triage vital signs and nursing notes.    ED Triage Vitals   Temp Heart Rate Resp BP SpO2   03/09/25 2334 03/09/25 2334 03/09/25 2334 03/09/25 2337 03/09/25 2334   97.2 °F (36.2 °C) 114 18 (!) 163/123 100 %      Temp src Heart Rate Source Patient Position BP Location FiO2 (%)   03/09/25 2334 -- 03/09/25 2337 03/09/25 2337 --   Tympanic  Sitting Left arm        Physical Exam  Constitutional:       General: She is not in acute distress.     Appearance: She is well-developed.   HENT:      Head: Normocephalic and atraumatic.   Eyes:      Extraocular Movements: Extraocular movements intact.   Cardiovascular:      Rate and Rhythm: Normal rate and regular rhythm.      Heart sounds: Normal heart sounds.      Comments: PICC line right upper extremity.  Distal pulses intact  Pulmonary:       Effort: Pulmonary effort is normal.      Breath sounds: Normal breath sounds.   Abdominal:      General: There is no distension.   Musculoskeletal:      Comments: Diffuse thoracolumbar spine tenderness.  No step-off or deformity   Skin:     General: Skin is warm.   Neurological:      General: No focal deficit present.      Mental Status: She is alert and oriented to person, place, and time.      Comments: SILT all 4 extremities.  Motor strength 5/5 all 4 extremities   Psychiatric:         Mood and Affect: Mood normal.             LAB RESULTS  Recent Results (from the past 24 hours)   Comprehensive Metabolic Panel    Collection Time: 03/10/25 12:11 AM    Specimen: Blood   Result Value Ref Range    Glucose 110 (H) 65 - 99 mg/dL    BUN 20 6 - 20 mg/dL    Creatinine 0.71 0.57 - 1.00 mg/dL    Sodium 143 136 - 145 mmol/L    Potassium 3.5 3.5 - 5.2 mmol/L    Chloride 106 98 - 107 mmol/L    CO2 23.7 22.0 - 29.0 mmol/L    Calcium 9.6 8.6 - 10.5 mg/dL    Total Protein 8.2 6.0 - 8.5 g/dL    Albumin 4.2 3.5 - 5.2 g/dL    ALT (SGPT) 9 1 - 33 U/L    AST (SGOT) 16 1 - 32 U/L    Alkaline Phosphatase 57 39 - 117 U/L    Total Bilirubin <0.2 0.0 - 1.2 mg/dL    Globulin 4.0 gm/dL    A/G Ratio 1.1 g/dL    BUN/Creatinine Ratio 28.2 (H) 7.0 - 25.0    Anion Gap 13.3 5.0 - 15.0 mmol/L    eGFR 106.3 >60.0 mL/min/1.73   C-reactive Protein    Collection Time: 03/10/25 12:11 AM    Specimen: Blood   Result Value Ref Range    C-Reactive Protein 5.73 (H) 0.00 - 0.50 mg/dL   Sedimentation Rate    Collection Time: 03/10/25 12:11 AM    Specimen: Blood   Result Value Ref Range    Sed Rate 77 (H) 0 - 20 mm/hr   CBC Auto Differential    Collection Time: 03/10/25 12:11 AM    Specimen: Blood   Result Value Ref Range    WBC 8.01 3.40 - 10.80 10*3/mm3    RBC 3.89 3.77 - 5.28 10*6/mm3    Hemoglobin 10.5 (L) 12.0 - 15.9 g/dL    Hematocrit 32.6 (L) 34.0 - 46.6 %    MCV 83.8 79.0 - 97.0 fL    MCH 27.0 26.6 - 33.0 pg    MCHC 32.2 31.5 - 35.7 g/dL    RDW 14.5  12.3 - 15.4 %    RDW-SD 44.4 37.0 - 54.0 fl    MPV 9.3 6.0 - 12.0 fL    Platelets 303 140 - 450 10*3/mm3    Neutrophil % 66.6 42.7 - 76.0 %    Lymphocyte % 26.0 19.6 - 45.3 %    Monocyte % 5.9 5.0 - 12.0 %    Eosinophil % 1.1 0.3 - 6.2 %    Basophil % 0.2 0.0 - 1.5 %    Immature Grans % 0.2 0.0 - 0.5 %    Neutrophils, Absolute 5.33 1.70 - 7.00 10*3/mm3    Lymphocytes, Absolute 2.08 0.70 - 3.10 10*3/mm3    Monocytes, Absolute 0.47 0.10 - 0.90 10*3/mm3    Eosinophils, Absolute 0.09 0.00 - 0.40 10*3/mm3    Basophils, Absolute 0.02 0.00 - 0.20 10*3/mm3    Immature Grans, Absolute 0.02 0.00 - 0.05 10*3/mm3    nRBC 0.0 0.0 - 0.2 /100 WBC           MEDICATIONS GIVEN IN ER  Medications   HYDROmorphone (DILAUDID) injection 1 mg (1 mg Intravenous Given 3/10/25 0015)   HYDROmorphone (DILAUDID) injection 1 mg (1 mg Intravenous Given 3/10/25 0143)         ORDERS PLACED DURING THIS VISIT:  Orders Placed This Encounter   Procedures    Comprehensive Metabolic Panel    C-reactive Protein    Sedimentation Rate    CBC Auto Differential    [facility] (on-call MD unless specified) Details    Initiate Observation Status    CBC & Differential         OUTPATIENT MEDICATION MANAGEMENT:  No current Epic-ordered facility-administered medications on file.     No current [facility]-ordered outpatient medications on file.         PROGRESS, DATA ANALYSIS, CONSULTS, AND MEDICAL DECISION MAKING  All labs have been independently interpreted by me.  All radiology studies have been reviewed by me. All EKG's have been independently viewed and interpreted by me.  Discussion below represents my analysis of pertinent findings related to patient's condition, differential diagnosis, treatment plan and final disposition.    Differential diagnosis includes but is not limited to osteomyelitis, spinal abscess, myositis.        ED Course as of 03/10/25 0229   Mon Mar 10, 2025   0037 WBC: 8.01 [MP]   0037 Hemoglobin(!): 10.5 [MP]   0037 Sed Rate(!): 77 [MP]   0130 Patient  requesting additional dose of pain medication.  Will plan to admit for pain control and updating MRI. [MP]   5897 I spoke with Cecy with [facility].  Discussed patient presentation and ED findings.  She agrees to admit patient to [facility] observation bed to the service of Dr. Mar. [MP]      ED Course User Index  [MP] Holly Barrera PA-C             AS OF 02:29 EDT VITALS:    BP - (!) 132/108  HR - 114  TEMP - 97.2 °F (36.2 °C) (Tympanic)  O2 SATS - 100%    COMPLEXITY OF CARE  The patient requires admission.      DIAGNOSIS  Final diagnoses:   Intractable back pain   Osteomyelitis of spine         DISPOSITION  ED Disposition       ED Disposition   Decision to Admit    Condition   --    Comment   Level of Care: Med/Surg [1]   Diagnosis: Osteomyelitis of spine [734896]   Admitting Physician: TRINA MAR [[id number]]   Attending Physician: TRINA MAR [[id number]]   Is patient appropriate for Inpatient Observation Unit?: No [0]                  Please note that portions of this document were completed with a voice recognition program.    Note Disclaimer: At [facility], we believe that sharing information builds trust and better relationships. You are receiving this note because you recently visited [facility]. It is possible you will see health information before a provider has talked with you about it. This kind of information can be easy to misunderstand. To help you fully understand what it means for your health, we urge you to discuss this note with your provider.     Holly Barrera PA-C  03/10/25 0229      Electronically signed by Erica Cota MD at 03/10/25 0326       Erica Cota MD at 03/10/25 0003            I have personally performed a face-to-face diagnostic evaluation of the patient.  I have reviewed and agree with the care plan as outlined by NP/PA.  My findings are as follows:    HPI:  Patient is a 44 y.o. female who presents with complaints of worsening back pain.  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  She is currently being treated with daptomycin and ceftriaxone till 4/1/2025.  Blood cultures were negative.  She says her biggest concern is an inadequate pain regimen.  She has an appointment upcoming with pain management, but she reports not being pain control in the meantime.  She was offered Suboxone, which does not help her symptoms so she has not taken it in at least a month.  No new numbness or weakness of the legs.  No saddle anesthesia.  No bladder or bowel dysfunction.  She has never had a surgery on her back.      PE:  Physical Exam  Constitutional:       Appearance: Normal appearance.   HENT:      Head: Normocephalic and atraumatic.   Eyes:      Conjunctiva/sclera: Conjunctivae normal.   Pulmonary:      Effort: Pulmonary effort is normal. No respiratory distress.   Musculoskeletal:      Comments: Thoracolumbar spine: Diffuse tenderness to palpation, mostly in the L3 area, gait normal, full strength in the bilateral lower extremities with normal sensation, 2+ DP and PT pulses   Skin:     Comments: PICC line in place right upper extremity   Neurological:      Mental Status: She is alert and oriented to person, place, and time.           MDM:  I provided a substantiate portion of the care of this patient. I personally performed the medical decision making.    Medical records are reviewed in [facility] and Care Everywhere, if applicable      Recent Results (from the past 24 hours)   Comprehensive Metabolic Panel    Collection Time: 03/10/25 12:11 AM    Specimen: Blood   Result Value Ref Range    Glucose 110 (H) 65 - 99 mg/dL    BUN 20 6 - 20 mg/dL    Creatinine 0.71 0.57 - 1.00 mg/dL    Sodium 143 136 - 145 mmol/L    Potassium 3.5 3.5 - 5.2 mmol/L    Chloride 106 98 - 107 mmol/L    CO2 23.7 22.0 - 29.0 mmol/L    Calcium 9.6 8.6 - 10.5  mg/dL    Total Protein 8.2 6.0 - 8.5 g/dL    Albumin 4.2 3.5 - 5.2 g/dL    ALT (SGPT) 9 1 - 33 U/L    AST (SGOT) 16 1 - 32 U/L    Alkaline Phosphatase 57 39 - 117 U/L    Total Bilirubin <0.2 0.0 - 1.2 mg/dL    Globulin 4.0 gm/dL    A/G Ratio 1.1 g/dL    BUN/Creatinine Ratio 28.2 (H) 7.0 - 25.0    Anion Gap 13.3 5.0 - 15.0 mmol/L    eGFR 106.3 >60.0 mL/min/1.73   C-reactive Protein    Collection Time: 03/10/25 12:11 AM    Specimen: Blood   Result Value Ref Range    C-Reactive Protein 5.73 (H) 0.00 - 0.50 mg/dL   Sedimentation Rate    Collection Time: 03/10/25 12:11 AM    Specimen: Blood   Result Value Ref Range    Sed Rate 77 (H) 0 - 20 mm/hr   CBC Auto Differential    Collection Time: 03/10/25 12:11 AM    Specimen: Blood   Result Value Ref Range    WBC 8.01 3.40 - 10.80 10*3/mm3    RBC 3.89 3.77 - 5.28 10*6/mm3    Hemoglobin 10.5 (L) 12.0 - 15.9 g/dL    Hematocrit 32.6 (L) 34.0 - 46.6 %    MCV 83.8 79.0 - 97.0 fL    MCH 27.0 26.6 - 33.0 pg    MCHC 32.2 31.5 - 35.7 g/dL    RDW 14.5 12.3 - 15.4 %    RDW-SD 44.4 37.0 - 54.0 fl    MPV 9.3 6.0 - 12.0 fL    Platelets 303 140 - 450 10*3/mm3    Neutrophil % 66.6 42.7 - 76.0 %    Lymphocyte % 26.0 19.6 - 45.3 %    Monocyte % 5.9 5.0 - 12.0 %    Eosinophil % 1.1 0.3 - 6.2 %    Basophil % 0.2 0.0 - 1.5 %    Immature Grans % 0.2 0.0 - 0.5 %    Neutrophils, Absolute 5.33 1.70 - 7.00 10*3/mm3    Lymphocytes, Absolute 2.08 0.70 - 3.10 10*3/mm3    Monocytes, Absolute 0.47 0.10 - 0.90 10*3/mm3    Eosinophils, Absolute 0.09 0.00 - 0.40 10*3/mm3    Basophils, Absolute 0.02 0.00 - 0.20 10*3/mm3    Immature Grans, Absolute 0.02 0.00 - 0.05 10*3/mm3    nRBC 0.0 0.0 - 0.2 /100 WBC     I have reviewed the above labs    This is a 44-year-old female with a past medical history of IVDU now clean who is presenting with complaints of intractable back pain.  I reviewed her recent medical records.  She has been evaluated in the outside hospital and is found to have spinal epidural abscess,  osteomyelitis and discitis.  She is currently being treated with daptomycin and ceftriaxone.  Her biggest complaint seems to be pain control.  I discussed with her that opioid pain medications may cause a relapse and may not be the best option for her.  However, she understands these risks and wants to proceed.  She says she will only use medication prescribed to her and is committed to her sobriety.    She last had an MRI about 1 month ago.  She did have a biopsy which was culture negative.  Given her worsening pain, may need to consider repeat imaging.  Given her exam demonstrating that she is not acutely threatened in terms of cord compression today, we will plan on admission to further evaluate as inflammatory markers uptrending.    Impression:  Final diagnoses:   Intractable back pain   Osteomyelitis of spine         Disposition:  ED Disposition       ED Disposition   Decision to Admit    Condition   --    Comment   Level of Care: Med/Surg [1]   Diagnosis: Osteomyelitis of spine [[id number]]   Admitting Physician: TRINA GUERRERO [[id number]]   Attending Physician: TRINA GUERRERO [[id number]]   Is patient appropriate for Inpatient Observation Unit?: No [0]                    Erica Cota MD  03/10/25 0236      Electronically signed by Erica Cota MD at 03/10/25 0236       Vital Signs (last 3 days)       Date/Time Temp Temp src Pulse Resp BP Patient Position SpO2    03/11/25 0717 97.5 (36.4) Oral 90 18 130/78 Lying 97    03/10/25 2348 97.9 (36.6) Oral 80 18 127/90 Lying 98    03/10/25 2100 98.1 (36.7) Oral 90 18 130/98 Lying 100    03/10/25 1612 97 (36.1) Oral 96 18 143/86 Lying 98    03/10/25 0730 98.6 (37) Oral 102 20 112/72 Lying 96    03/10/25 0436 98.6 (37) Oral 102 22 164/98 Lying 98    03/10/25 0231 -- -- -- -- 124/82 -- --    03/09/25 2343 -- -- -- -- 132/108 Sitting --    03/09/25 2337 -- -- -- -- 163/123 Sitting --    03/09/25 2334 97.2 (36.2) Tympanic 114 18 -- -- 100          Oxygen Therapy (last 3  days)       Date/Time SpO2 Device (Oxygen Therapy) Flow (L/min) (Oxygen Therapy) Oxygen Concentration (%) ETCO2 (mmHg)    03/11/25 0725 -- room air -- -- --    03/11/25 0717 97 -- -- -- --    03/11/25 0026 -- room air -- -- --    03/10/25 2348 98 room air -- -- --    03/10/25 2100 100 room air -- -- --    03/10/25 2049 -- room air -- -- --    03/10/25 1612 98 room air -- -- --    03/10/25 1436 -- room air -- -- --    03/10/25 0852 -- room air -- -- --    03/10/25 0730 96 -- -- -- --    03/10/25 0436 98 room air -- -- --    03/09/25 2334 100 room air -- -- --          Intake & Output (last 3 days)         03/08 0701  03/09 0700 03/09 0701  03/10 0700 03/10 0701  03/11 0700 03/11 0701  03/12 0700    P.O.   120     Total Intake(mL/kg)   120 (1.9)     Net   +120                    Lines, Drains & Airways       Active LDAs       Name Placement date Placement time Site Days    PICC Single Lumen 02/19/25 Right 02/19/25  --  --  20    Peripheral IV 07/21/21 1434 Right Antecubital 07/21/21  1434  Antecubital  1328                  Current Facility-Administered Medications   Medication Dose Route Frequency Provider Last Rate Last Admin    acetaminophen (TYLENOL) tablet 650 mg  650 mg Oral Q4H PRN Cecy Allen APRN   650 mg at 03/11/25 0439    Or    acetaminophen (TYLENOL) 160 MG/5ML oral solution 650 mg  650 mg Oral Q4H PRN Cecy Allen APRN        Or    acetaminophen (TYLENOL) suppository 650 mg  650 mg Rectal Q4H PRN Cecy Allen APRN        sennosides-docusate (PERICOLACE) 8.6-50 MG per tablet 2 tablet  2 tablet Oral BID PRN Cecy Allen APRN        And    polyethylene glycol (MIRALAX) packet 17 g  17 g Oral Daily PRN Cecy Allen APRN        And    bisacodyl (DULCOLAX) EC tablet 5 mg  5 mg Oral Daily PRN Cecy Allen APRN        And    bisacodyl (DULCOLAX) suppository 10 mg  10 mg Rectal Daily PRN Cecy Allen APRN        cefTRIAXone (ROCEPHIN) 1,000 mg in sodium  chloride 0.9 % 100 mL MBP  1,000 mg Intravenous Q24H Cecy Allen APRN   Stopped at 03/11/25 0724    DAPTOmycin (CUBICIN) 500 mg in sodium chloride 0.9 % 50 mL IVPB  8 mg/kg Intravenous Q24H Cecy Allen, APRN 100 mL/hr at 03/11/25 0820 500 mg at 03/11/25 0820    famotidine (PEPCID) tablet 20 mg  20 mg Oral BID PRN Cecy Allen APRN        HYDROcodone-acetaminophen (NORCO) 5-325 MG per tablet 2 tablet  2 tablet Oral Q6H PRN Remberto Richard MD   2 tablet at 03/11/25 1205    melatonin tablet 5 mg  5 mg Oral Nightly PRN Cecy Allen APRN        nitroglycerin (NITROSTAT) SL tablet 0.4 mg  0.4 mg Sublingual Q5 Min PRN Cecy Allen APRN        sodium chloride 0.9 % flush 10 mL  10 mL Intravenous Q12H Cecy Allen, APRN   10 mL at 03/11/25 1206    sodium chloride 0.9 % flush 10 mL  10 mL Intravenous PRN Cecy Allen, APRN        sodium chloride 0.9 % infusion 40 mL  40 mL Intravenous PRN Cecy Allen, APRN         Lab Results (all)       Procedure Component Value Units Date/Time    Comprehensive Metabolic Panel [[id number]]  (Abnormal) Collected: 03/11/25 0513    Specimen: Blood Updated: 03/11/25 0549     Glucose 118 mg/dL      BUN 9 mg/dL      Creatinine 0.48 mg/dL      Sodium 141 mmol/L      Potassium 3.6 mmol/L      Chloride 104 mmol/L      CO2 24.2 mmol/L      Calcium 9.2 mg/dL      Total Protein 7.3 g/dL      Albumin 3.6 g/dL      ALT (SGPT) 11 U/L      AST (SGOT) 13 U/L      Alkaline Phosphatase 41 U/L      Total Bilirubin <0.2 mg/dL      Globulin 3.7 gm/dL      A/G Ratio 1.0 g/dL      BUN/Creatinine Ratio 18.8     Anion Gap 12.8 mmol/L      eGFR 118.5 mL/min/1.73     Narrative:      GFR Categories in Chronic Kidney Disease (CKD)      GFR Category          GFR (mL/min/1.73)    Interpretation  G1                     90 or greater         Normal or high (1)  G2                      60-89                Mild decrease (1)  G3a                   45-59                 Mild to moderate decrease  G3b                   30-44                Moderate to severe decrease  G4                    15-29                Severe decrease  G5                    14 or less           Kidney failure          (1)In the absence of evidence of kidney disease, neither GFR category G1 or G2 fulfill the criteria for CKD.    eGFR calculation 2021 CKD-EPI creatinine equation, which does not include race as a factor    CBC & Differential [[id number]]  (Abnormal) Collected: 03/11/25 0513    Specimen: Blood Updated: 03/11/25 0532    Narrative:      The following orders were created for panel order CBC & Differential.  Procedure                               Abnormality         Status                     ---------                               -----------         ------                     CBC Auto Differential[[id number]]        Abnormal            Final result                 Please view results for these tests on the individual orders.    CBC Auto Differential [[id number]]  (Abnormal) Collected: 03/11/25 0513    Specimen: Blood Updated: 03/11/25 0532     WBC 5.37 10*3/mm3      RBC 3.89 10*6/mm3      Hemoglobin 10.3 g/dL      Hematocrit 32.2 %      MCV 82.8 fL      MCH 26.5 pg      MCHC 32.0 g/dL      RDW 14.4 %      RDW-SD 42.8 fl      MPV 9.4 fL      Platelets 252 10*3/mm3      Neutrophil % 66.4 %      Lymphocyte % 23.3 %      Monocyte % 7.3 %      Eosinophil % 2.2 %      Basophil % 0.4 %      Immature Grans % 0.4 %      Neutrophils, Absolute 3.57 10*3/mm3      Lymphocytes, Absolute 1.25 10*3/mm3      Monocytes, Absolute 0.39 10*3/mm3      Eosinophils, Absolute 0.12 10*3/mm3      Basophils, Absolute 0.02 10*3/mm3      Immature Grans, Absolute 0.02 10*3/mm3      nRBC 0.0 /100 WBC     Comprehensive Metabolic Panel [[id number]]  (Abnormal) Collected: 03/10/25 0606    Specimen: Blood Updated: 03/10/25 0638     Glucose 93 mg/dL      BUN 16 mg/dL      Creatinine 0.63 mg/dL      Sodium 139 mmol/L       Potassium 3.8 mmol/L      Chloride 104 mmol/L      CO2 24.4 mmol/L      Calcium 9.7 mg/dL      Total Protein 8.1 g/dL      Albumin 4.0 g/dL      ALT (SGPT) 8 U/L      AST (SGOT) 16 U/L      Alkaline Phosphatase 50 U/L      Total Bilirubin <0.2 mg/dL      Globulin 4.1 gm/dL      A/G Ratio 1.0 g/dL      BUN/Creatinine Ratio 25.4     Anion Gap 10.6 mmol/L      eGFR 111.0 mL/min/1.73     Narrative:      GFR Categories in Chronic Kidney Disease (CKD)      GFR Category          GFR (mL/min/1.73)    Interpretation  G1                     90 or greater         Normal or high (1)  G2                      60-89                Mild decrease (1)  G3a                   45-59                Mild to moderate decrease  G3b                   30-44                Moderate to severe decrease  G4                    15-29                Severe decrease  G5                    14 or less           Kidney failure          (1)In the absence of evidence of kidney disease, neither GFR category G1 or G2 fulfill the criteria for CKD.    eGFR calculation 2021 CKD-EPI creatinine equation, which does not include race as a factor    Lactic Acid, Plasma [[id number]]  (Normal) Collected: 03/10/25 0606    Specimen: Blood Updated: 03/10/25 0638     Lactate 1.2 mmol/L     CBC (No Diff) [[id number]]  (Abnormal) Collected: 03/10/25 0606    Specimen: Blood Updated: 03/10/25 0621     WBC 7.07 10*3/mm3      RBC 3.89 10*6/mm3      Hemoglobin 10.5 g/dL      Hematocrit 32.3 %      MCV 83.0 fL      MCH 27.0 pg      MCHC 32.5 g/dL      RDW 14.4 %      RDW-SD 43.2 fl      MPV 9.7 fL      Platelets 223 10*3/mm3     Comprehensive Metabolic Panel [[id number]]  (Abnormal) Collected: 03/10/25 0011    Specimen: Blood Updated: 03/10/25 0113     Glucose 110 mg/dL      BUN 20 mg/dL      Creatinine 0.71 mg/dL      Sodium 143 mmol/L      Potassium 3.5 mmol/L      Chloride 106 mmol/L      CO2 23.7 mmol/L      Calcium 9.6 mg/dL      Total Protein 8.2 g/dL      Albumin 4.2 g/dL       ALT (SGPT) 9 U/L      AST (SGOT) 16 U/L      Alkaline Phosphatase 57 U/L      Total Bilirubin <0.2 mg/dL      Globulin 4.0 gm/dL      A/G Ratio 1.1 g/dL      BUN/Creatinine Ratio 28.2     Anion Gap 13.3 mmol/L      eGFR 106.3 mL/min/1.73     Narrative:      GFR Categories in Chronic Kidney Disease (CKD)      GFR Category          GFR (mL/min/1.73)    Interpretation  G1                     90 or greater         Normal or high (1)  G2                      60-89                Mild decrease (1)  G3a                   45-59                Mild to moderate decrease  G3b                   30-44                Moderate to severe decrease  G4                    15-29                Severe decrease  G5                    14 or less           Kidney failure          (1)In the absence of evidence of kidney disease, neither GFR category G1 or G2 fulfill the criteria for CKD.    eGFR calculation 2021 CKD-EPI creatinine equation, which does not include race as a factor    C-reactive Protein [[id number]]  (Abnormal) Collected: 03/10/25 0011    Specimen: Blood Updated: 03/10/25 0113     C-Reactive Protein 5.73 mg/dL     Sedimentation Rate [[id number]]  (Abnormal) Collected: 03/10/25 0011    Specimen: Blood Updated: 03/10/25 0031     Sed Rate 77 mm/hr     CBC & Differential [[id number]]  (Abnormal) Collected: 03/10/25 0011    Specimen: Blood Updated: 03/10/25 0020    Narrative:      The following orders were created for panel order CBC & Differential.  Procedure                               Abnormality         Status                     ---------                               -----------         ------                     CBC Auto Differential[[id number]]        Abnormal            Final result                 Please view results for these tests on the individual orders.    CBC Auto Differential [[id number]]  (Abnormal) Collected: 03/10/25 0011    Specimen: Blood Updated: 03/10/25 0020     WBC 8.01 10*3/mm3      RBC 3.89 10*6/mm3       Hemoglobin 10.5 g/dL      Hematocrit 32.6 %      MCV 83.8 fL      MCH 27.0 pg      MCHC 32.2 g/dL      RDW 14.5 %      RDW-SD 44.4 fl      MPV 9.3 fL      Platelets 303 10*3/mm3      Neutrophil % 66.6 %      Lymphocyte % 26.0 %      Monocyte % 5.9 %      Eosinophil % 1.1 %      Basophil % 0.2 %      Immature Grans % 0.2 %      Neutrophils, Absolute 5.33 10*3/mm3      Lymphocytes, Absolute 2.08 10*3/mm3      Monocytes, Absolute 0.47 10*3/mm3      Eosinophils, Absolute 0.09 10*3/mm3      Basophils, Absolute 0.02 10*3/mm3      Immature Grans, Absolute 0.02 10*3/mm3      nRBC 0.0 /100 WBC           Imaging Results (All)       Procedure Component Value Units Date/Time    MRI Lumbar Spine With & Without Contrast [[id number]] Collected: 03/10/25 1212     Updated: 03/10/25 1249    Narrative:      MRI LUMBAR SPINE W WO CONTRAST-     HISTORY:  increasing back pain, known abscess; M54.9-Dorsalgia,  unspecified; M46.20-Osteomyelitis of vertebra, site unspecified;  [id number] for follow-up examination after completed treatment for  conditions other than malignant neoplasm     COMPARISON: MRI lumbar spine 2/14/2025     FINDINGS: Mild levoscoliosis of the lumbar spine is appreciated with the  apex at the level of L3. Grade 1 anterolisthesis of L3 upon L4 is  appreciated estimated be 2 mm similar in appearance as compared to the  prior examination. T2 hyperintensity is appreciated involving the L3-L4  disc and there is edema involving the L3 and L4 vertebral bodies  consistent with discitis/osteomyelitis.     T12-L1: There is no evidence of a disc bulge or herniation.     L1-L2: There is no evidence of disc bulge or herniation.     L2-L3: There is no evidence of disc bulge or herniation.     L3-L4: There is an area of T2 hyperintensity posterior to the L3  vertebral body extending from the L3-L4 disc space to the mid body of  L3. This was present previously. After contrast administration central  areas of nonenhancement are  appreciated which were also present  previously. The axial T2 sequences demonstrates a circumferential rim of  T2 anterior, anterolateral and lateral to the disc which was present  previously but is more prominent as compared to the prior examination.  Areas of nonenhancement are appreciated involving the lateral aspects of  the fluid collections on the right and involving the medial aspects of  the psoas muscles suggesting abscesses the largest of which measures  approximately 13 mm in the maximum transverse dimension. These are new  versus 2/14/2025. Edema is appreciated involving the medial aspects of  the psoas muscles bilaterally, increased versus 2/14/2025. Mild  foraminal stenosis is present on the left and there is severe foraminal  stenosis on the right secondary to extension of disc material as well as  fluid into the neural foramen. Foraminal stenosis appears similar to  2/14/2015.     L4-L5: Mild facet degenerative disease is present bilaterally. A mild  central disc osteophyte complex is present resulting in mild flattening  of the ventral surface of the thecal sac. Mild foraminal stenosis is  present bilaterally secondary to extension of the disc osteophyte  complex into the neural foramen.     L5-S1: Mild facet degenerative disease is present bilaterally. A central  disc osteophyte complex is present resulting in mild flattening of the  ventral surface of the thecal sac. Mild foraminal stenosis is present  bilaterally secondary to extension of the small disc osteophyte complex  into the neural foramen.       Impression:      Again identified is T2 hyperintensity involving the disc at  L3-L4 as well as involving the adjacent L3 and L4 vertebral bodies  consistent with discitis/osteomyelitis. An area of T2 hyperintensity is  appreciated posterior to the vertebral body which doesn't enhance after  contrast administration consistent with a phlegmonous collection.  However, a small area of nonenhancement  is present posterior to the L3  vertebral body which was present previously suspicious for a small  abscess. Small areas of nonenhancement are also appreciated involving  the lateral aspect of the perivertebral fluid collection to the  right/medial aspect of the right psoas muscle which are new versus  12/14/2025 and consistent with abscesses the largest of which measures  approximately 13 mm in size. Edema is appreciated involving the medial  aspect of the psoas muscles bilaterally, increased slightly as compared  to 2/14/2025.        This report was finalized on 3/10/2025 12:46 PM by Dr. Yuval Childress M.D on Workstation: [id number]       MRI Outside Films [[id number]] Resulted: 03/10/25 1022     Updated: 03/10/25 1022    Narrative:      This procedure was auto-finalized with no dictation required.    MRI Outside Films [[id number]] Resulted: 03/10/25 1021     Updated: 03/10/25 1021    Narrative:      This procedure was auto-finalized with no dictation required.    XR Chest Post CVA Port [[id number]] Collected: 03/10/25 0622     Updated: 03/10/25 0627    Narrative:      XR CHEST POST CVA PORT-     Clinical: PICC line placement     COMPARISON examinations dated 7/21/2021     FINDINGS: Right upper extremity PICC line tip is near the junction of  the superior vena cava and right atrium. If placement within the mid SVC  is desired, the catheter can be pulled back 3-4 cm. Cardiac size within  normal limits. There are median sternotomy wires and vascular markers in  position as before. No effusion, edema or acute airspace disease has  developed.     The remainder is unremarkable.     This report was finalized on 3/10/2025 6:24 AM by Dr. Samuel Lomax M.D  on Workstation: [id number]             ECG/EMG Results (all)       Procedure Component Value Units Date/Time    Telemetry Scan [[id number]] Resulted: 03/09/25     Updated: 03/10/25 1422    Telemetry Scan [[id number]] Resulted: 03/09/25     Updated: 03/10/25 1746     Telemetry Scan [[id number]] Resulted: 25     Updated: 25 004    Telemetry Scan [[id number]] Resulted: 25     Updated: 25 0422          Remberto Richard MD   Physician  Hospitalist     Progress Notes      Signed     Date of Service: 25  Creation Time: 25     Signed       Expand All Collapse All[]Expand All by Default         Name: Dawn Young ADMIT: 3/9/2025   : 1980  PCP: Justa Wong APRN    MRN: [id number] LOS: 0 days   AGE/SEX: 44 y.o. female  ROOM: [facility]      Subjective      Subjective  Patient is lying on the bed and continues to complain of back pain.  Denies nausea, vomiting, chest pain, shortness of breath.        Objective      Objective  Vital Signs  Temp:  [97 °F (36.1 °C)-98.1 °F (36.7 °C)] 97.5 °F (36.4 °C)  Heart Rate:  [80-96] 90  Resp:  [18] 18  BP: (127-143)/(78-98) 130/78  SpO2:  [97 %-100 %] 97 %  on   ;   Device (Oxygen Therapy): room air  Body mass index is 24.82 kg/m².  Physical Exam  HEENT: PERRLA, extraocular movements intact, Scleras no icterus  Neck: Supple, no JVD  Cardiovascular: Regular rate and rhythm with normal S1 and S2  Respiratory: Fairly clear to auscultation anteriorly with no wheezes  GI: Soft, nontender, bowel sounds present  EXTR: No edema, palpable pulses  Neurologic: Grossly nonfocal, no facial asymmetry     Results Review     I reviewed the patient's new clinical results.         Results from last 7 days   Lab Units 25  0513 03/10/25  0606 03/10/25  0011   WBC 10*3/mm3 5.37 7.07 8.01   HEMOGLOBIN g/dL 10.3* 10.5* 10.5*   PLATELETS 10*3/mm3 252 223 303             Results from last 7 days   Lab Units 25  0513 03/10/25  0606 03/10/25  0011   SODIUM mmol/L 141 139 143   POTASSIUM mmol/L 3.6 3.8 3.5   CHLORIDE mmol/L 104 104 106   CO2 mmol/L 24.2 24.4 23.7   BUN mg/dL 9 16 20   CREATININE mg/dL 0.48* 0.63 0.71   GLUCOSE mg/dL 118* 93 110*   EGFR mL/min/1.73 118.5 111.0 106.3             Results from  "last 7 days   Lab Units 03/11/25  0513 03/10/25  0606 03/10/25  0011   ALBUMIN g/dL 3.6 4.0 4.2   BILIRUBIN mg/dL <0.2 <0.2 <0.2   ALK PHOS U/L 41 50 57   AST (SGOT) U/L 13 16 16   ALT (SGPT) U/L 11 8 9             Results from last 7 days   Lab Units 03/11/25  0513 03/10/25  0606 03/10/25  0011   CALCIUM mg/dL 9.2 9.7 9.6   ALBUMIN g/dL 3.6 4.0 4.2           Results from last 7 days   Lab Units 03/10/25  0606   LACTATE mmol/L 1.2      No results found for: \"HGBA1C\", \"POCGLU\"     MRI Lumbar Spine With & Without Contrast  Result Date: 3/10/2025  Again identified is T2 hyperintensity involving the disc at L3-L4 as well as involving the adjacent L3 and L4 vertebral bodies consistent with discitis/osteomyelitis. An area of T2 hyperintensity is appreciated posterior to the vertebral body which doesn't enhance after contrast administration consistent with a phlegmonous collection. However, a small area of nonenhancement is present posterior to the L3 vertebral body which was present previously suspicious for a small abscess. Small areas of nonenhancement are also appreciated involving the lateral aspect of the perivertebral fluid collection to the right/medial aspect of the right psoas muscle which are new versus 12/14/2025 and consistent with abscesses the largest of which measures approximately 13 mm in size. Edema is appreciated involving the medial aspect of the psoas muscles bilaterally, increased slightly as compared to 2/14/2025.   This report was finalized on 3/10/2025 12:46 PM by Dr. Yuval Childress M.D on Workstation: [id number]          I have personally reviewed all medications:  Scheduled Medications    Scheduled Medication   cefTRIAXone, 1,000 mg, Intravenous, Once  [START ON 3/12/2025] cefTRIAXone, 2,000 mg, Intravenous, Q24H  cyclobenzaprine, 10 mg, Oral, TID  [START ON 3/12/2025] DAPTOmycin, 10 mg/kg, Intravenous, Q24H  Lidocaine, 2 patch, Transdermal, Q24H  sodium chloride, 10 mL, Intravenous, Q12H    "   Infusions  Infusion Medications       Diet  Diet: Cardiac; Healthy Heart (2-3 Na+); Fluid Consistency: Thin (IDDSI 0)     I have personally reviewed:  [x]  Laboratory   [x]  Microbiology   [x]  Radiology   [x]  EKG/Telemetry  [x]  Cardiology/Vascular   []  Pathology    []  Records        Assessment/Plan            Active Hospital Problems     Diagnosis   POA    **Osteomyelitis of spine [M46.20]   Yes    Osteomyelitis [M86.9]   Yes    Epidural abscess [G06.2]   Unknown    Hepatitis B [B19.10]   Yes    H/O mitral valve replacement [Z95.2]   Not Applicable    IVDU (intravenous drug user) [F19.90]   Yes       Resolved Hospital Problems   No resolved problems to display.         44 y.o. female admitted with Osteomyelitis of spine.       1.Lumbar  spine discitis/epidural abscess, was on daptomycin and Rocephin prior to this hospitalization which will be continued.  Stop date is 4/1.  MRI findings suggestive of discitis/abscess and neurosurgery consult will be obtained as well.     2.  History of IV drug use, states she has been abstinent since past one year.     3.  History of mitral valve replacement in October secondary to endocarditis, bioprosthetic per patient.     4.  History of hepatitis B, advised her to follow-up with GI as an outpatient basis.     5.  On SCDs for DVT prophylaxis.     6.  CODE STATUS is full code.     7.  Estimated discharge date, to be determined.     Copy text on this note has been reviewed by me on 3/11/2025           Remberto Richard MD  [facility]  03/11/25  13:47 EDT

## 2025-03-11 NOTE — PLAN OF CARE
Goal Outcome Evaluation:         Pain meds increased to two tablets every six hours. Trazadone and tizanidine added. Refused chg wipes with nursing assistant all day. Will try again at 1800 when meds are due. Access consult placed. Came to hospital with PICC line. Neuro surgery was consulted and said no surgery. Up ad otto. Safety maintained. Mylene ZARAGOZA    Report given to night shift rn to try chg wipes since patient has been asleep since 1800.

## 2025-03-12 LAB
ALBUMIN SERPL-MCNC: 3.8 G/DL (ref 3.5–5.2)
ALBUMIN/GLOB SERPL: 1.1 G/DL
ALP SERPL-CCNC: 42 U/L (ref 39–117)
ALT SERPL W P-5'-P-CCNC: 10 U/L (ref 1–33)
ANION GAP SERPL CALCULATED.3IONS-SCNC: 9.4 MMOL/L (ref 5–15)
AST SERPL-CCNC: 15 U/L (ref 1–32)
BASOPHILS # BLD AUTO: 0.02 10*3/MM3 (ref 0–0.2)
BASOPHILS NFR BLD AUTO: 0.4 % (ref 0–1.5)
BILIRUB SERPL-MCNC: <0.2 MG/DL (ref 0–1.2)
BUN SERPL-MCNC: 12 MG/DL (ref 6–20)
BUN/CREAT SERPL: 24.5 (ref 7–25)
CALCIUM SPEC-SCNC: 9.4 MG/DL (ref 8.6–10.5)
CHLORIDE SERPL-SCNC: 104 MMOL/L (ref 98–107)
CO2 SERPL-SCNC: 26.6 MMOL/L (ref 22–29)
CREAT SERPL-MCNC: 0.49 MG/DL (ref 0.57–1)
DEPRECATED RDW RBC AUTO: 42.2 FL (ref 37–54)
EGFRCR SERPLBLD CKD-EPI 2021: 117.9 ML/MIN/1.73
EOSINOPHIL # BLD AUTO: 0.1 10*3/MM3 (ref 0–0.4)
EOSINOPHIL NFR BLD AUTO: 1.8 % (ref 0.3–6.2)
ERYTHROCYTE [DISTWIDTH] IN BLOOD BY AUTOMATED COUNT: 14.2 % (ref 12.3–15.4)
GLOBULIN UR ELPH-MCNC: 3.6 GM/DL
GLUCOSE SERPL-MCNC: 111 MG/DL (ref 65–99)
HCT VFR BLD AUTO: 31.7 % (ref 34–46.6)
HGB BLD-MCNC: 10.5 G/DL (ref 12–15.9)
IMM GRANULOCYTES # BLD AUTO: 0.02 10*3/MM3 (ref 0–0.05)
IMM GRANULOCYTES NFR BLD AUTO: 0.4 % (ref 0–0.5)
LYMPHOCYTES # BLD AUTO: 1.58 10*3/MM3 (ref 0.7–3.1)
LYMPHOCYTES NFR BLD AUTO: 29.2 % (ref 19.6–45.3)
MCH RBC QN AUTO: 27.3 PG (ref 26.6–33)
MCHC RBC AUTO-ENTMCNC: 33.1 G/DL (ref 31.5–35.7)
MCV RBC AUTO: 82.6 FL (ref 79–97)
MONOCYTES # BLD AUTO: 0.37 10*3/MM3 (ref 0.1–0.9)
MONOCYTES NFR BLD AUTO: 6.8 % (ref 5–12)
NEUTROPHILS NFR BLD AUTO: 3.33 10*3/MM3 (ref 1.7–7)
NEUTROPHILS NFR BLD AUTO: 61.4 % (ref 42.7–76)
NRBC BLD AUTO-RTO: 0 /100 WBC (ref 0–0.2)
PLATELET # BLD AUTO: 248 10*3/MM3 (ref 140–450)
PMV BLD AUTO: 9.4 FL (ref 6–12)
POTASSIUM SERPL-SCNC: 3.7 MMOL/L (ref 3.5–5.2)
PROT SERPL-MCNC: 7.4 G/DL (ref 6–8.5)
RBC # BLD AUTO: 3.84 10*6/MM3 (ref 3.77–5.28)
SODIUM SERPL-SCNC: 140 MMOL/L (ref 136–145)
WBC NRBC COR # BLD AUTO: 5.42 10*3/MM3 (ref 3.4–10.8)

## 2025-03-12 PROCEDURE — 80053 COMPREHEN METABOLIC PANEL: CPT | Performed by: INTERNAL MEDICINE

## 2025-03-12 PROCEDURE — 85025 COMPLETE CBC W/AUTO DIFF WBC: CPT | Performed by: INTERNAL MEDICINE

## 2025-03-12 PROCEDURE — G0545 PR INHERENT VISIT TO INPT: HCPCS | Performed by: STUDENT IN AN ORGANIZED HEALTH CARE EDUCATION/TRAINING PROGRAM

## 2025-03-12 PROCEDURE — 25010000002 DAPTOMYCIN PER 1 MG: Performed by: NURSE PRACTITIONER

## 2025-03-12 PROCEDURE — 99223 1ST HOSP IP/OBS HIGH 75: CPT | Performed by: STUDENT IN AN ORGANIZED HEALTH CARE EDUCATION/TRAINING PROGRAM

## 2025-03-12 PROCEDURE — 25010000002 CEFTRIAXONE PER 250 MG: Performed by: NURSE PRACTITIONER

## 2025-03-12 RX ADMIN — METOPROLOL SUCCINATE 50 MG: 50 TABLET, EXTENDED RELEASE ORAL at 08:31

## 2025-03-12 RX ADMIN — CEFTRIAXONE 2000 MG: 2 INJECTION, POWDER, FOR SOLUTION INTRAMUSCULAR; INTRAVENOUS at 03:42

## 2025-03-12 RX ADMIN — TRAZODONE HYDROCHLORIDE 50 MG: 50 TABLET ORAL at 21:29

## 2025-03-12 RX ADMIN — CYCLOBENZAPRINE HYDROCHLORIDE 10 MG: 10 TABLET, FILM COATED ORAL at 15:08

## 2025-03-12 RX ADMIN — CYCLOBENZAPRINE HYDROCHLORIDE 10 MG: 10 TABLET, FILM COATED ORAL at 21:29

## 2025-03-12 RX ADMIN — TRAZODONE HYDROCHLORIDE 50 MG: 50 TABLET ORAL at 15:08

## 2025-03-12 RX ADMIN — APIXABAN 5 MG: 5 TABLET, FILM COATED ORAL at 08:31

## 2025-03-12 RX ADMIN — TRAZODONE HYDROCHLORIDE 50 MG: 50 TABLET ORAL at 08:31

## 2025-03-12 RX ADMIN — APIXABAN 5 MG: 5 TABLET, FILM COATED ORAL at 21:29

## 2025-03-12 RX ADMIN — HYDROCODONE BITARTRATE AND ACETAMINOPHEN 2 TABLET: 5; 325 TABLET ORAL at 21:29

## 2025-03-12 RX ADMIN — HYDROCODONE BITARTRATE AND ACETAMINOPHEN 2 TABLET: 5; 325 TABLET ORAL at 08:31

## 2025-03-12 RX ADMIN — HYDROCODONE BITARTRATE AND ACETAMINOPHEN 2 TABLET: 5; 325 TABLET ORAL at 02:34

## 2025-03-12 RX ADMIN — DAPTOMYCIN 650 MG: 500 INJECTION, POWDER, LYOPHILIZED, FOR SOLUTION INTRAVENOUS at 08:31

## 2025-03-12 RX ADMIN — CYCLOBENZAPRINE HYDROCHLORIDE 10 MG: 10 TABLET, FILM COATED ORAL at 08:31

## 2025-03-12 RX ADMIN — Medication 5 MG: at 21:29

## 2025-03-12 RX ADMIN — Medication 10 ML: at 21:00

## 2025-03-12 RX ADMIN — Medication 10 ML: at 09:15

## 2025-03-12 RX ADMIN — HYDROCODONE BITARTRATE AND ACETAMINOPHEN 2 TABLET: 5; 325 TABLET ORAL at 15:08

## 2025-03-12 NOTE — PROGRESS NOTES
Name: Dawn Young ADMIT: 3/9/2025   : 1980  PCP: Justa Wong APRN    MRN: [id number] LOS: 1 days   AGE/SEX: 44 y.o. female  ROOM: [facility]     Subjective   Subjective   Patient is lying on the bed and back pain is better controlled today.  Denies nausea, vomiting, chest pain, shortness of breath.       Objective   Objective   Vital Signs  Temp:  [97.5 °F (36.4 °C)-98.2 °F (36.8 °C)] 98.2 °F (36.8 °C)  Heart Rate:  [78-96] 96  Resp:  [18] 18  BP: (105-131)/(72-90) 114/78  SpO2:  [98 %] 98 %  on   ;   Device (Oxygen Therapy): room air  Body mass index is 24.82 kg/m².  Physical Exam  HEENT: PERRLA, extraocular movements intact, Scleras no icterus  Neck: Supple, no JVD  Cardiovascular: Regular rate and rhythm with normal S1 and S2  Respiratory: Fairly clear to auscultation anteriorly with no wheezes  GI: Soft, nontender, bowel sounds present  EXTR: No edema, palpable pulses  Neurologic: Grossly nonfocal, no facial asymmetry    Results Review     I reviewed the patient's new clinical results.  Results from last 7 days   Lab Units 03/12/25  0519 03/11/25  0513 03/10/25  0606 03/10/25  0011   WBC 10*3/mm3 5.42 5.37 7.07 8.01   HEMOGLOBIN g/dL 10.5* 10.3* 10.5* 10.5*   PLATELETS 10*3/mm3 248 252 223 303     Results from last 7 days   Lab Units 03/12/25  0519 03/11/25  0513 03/10/25  0606 03/10/25  0011   SODIUM mmol/L 140 141 139 143   POTASSIUM mmol/L 3.7 3.6 3.8 3.5   CHLORIDE mmol/L 104 104 104 106   CO2 mmol/L 26.6 24.2 24.4 23.7   BUN mg/dL 12 9 16 20   CREATININE mg/dL 0.49* 0.48* 0.63 0.71   GLUCOSE mg/dL 111* 118* 93 110*   EGFR mL/min/1.73 117.9 118.5 111.0 106.3     Results from last 7 days   Lab Units 25  0513 03/10/25  0606 03/10/25  0011   ALBUMIN g/dL 3.8 3.6 4.0 4.2   BILIRUBIN mg/dL <0.2 <0.2 <0.2 <0.2   ALK PHOS U/L 42 41 50 57   AST (SGOT) U/L 15 13 16 16   ALT (SGPT) U/L 10 11 8 9     Results from last 7 days   Lab Units 03/12/25  0519 03/11/25  0513 03/10/25  0606  "03/10/25  0011   CALCIUM mg/dL 9.4 9.2 9.7 9.6   ALBUMIN g/dL 3.8 3.6 4.0 4.2     Results from last 7 days   Lab Units 03/10/25  0606   LACTATE mmol/L 1.2     No results found for: \"HGBA1C\", \"POCGLU\"    No radiology results for the last day      I have personally reviewed all medications:  Scheduled Medications  apixaban, 5 mg, Oral, Q12H  cefTRIAXone, 2,000 mg, Intravenous, Q24H  cyclobenzaprine, 10 mg, Oral, TID  DAPTOmycin, 10 mg/kg, Intravenous, Q24H  Lidocaine, 2 patch, Transdermal, Q24H  metoprolol succinate XL, 50 mg, Oral, Daily  sodium chloride, 10 mL, Intravenous, Q12H  traZODone, 50 mg, Oral, TID    Infusions   Diet  Diet: Cardiac; Healthy Heart (2-3 Na+); Fluid Consistency: Thin (IDDSI 0)    I have personally reviewed:  [x]  Laboratory   [x]  Microbiology   [x]  Radiology   [x]  EKG/Telemetry  [x]  Cardiology/Vascular   []  Pathology    []  Records       Assessment/Plan     Active Hospital Problems    Diagnosis  POA    **Osteomyelitis of spine [M46.20]  Yes    Osteomyelitis [M86.9]  Yes    Epidural abscess [G06.2]  Unknown    Hepatitis B [B19.10]  Yes    H/O mitral valve replacement [Z95.2]  Not Applicable    IVDU (intravenous drug user) [F19.90]  Yes      Resolved Hospital Problems   No resolved problems to display.       44 y.o. female admitted with Osteomyelitis of spine.       1.Lumbar  spine discitis/epidural abscess, was on daptomycin and Rocephin prior to this hospitalization which will be continued.  Stop date is 4/1 25.  MRI findings suggestive of discitis/abscess and neurosurgery consult was obtained and no plans for any surgical intervention.  Infectious disease did evaluate and recommended to continue antibiotics as planned previously.  Hopefully home tomorrow.    2.  History of IV drug use, states she has been abstinent since past one year.    3.  History of mitral valve replacement in October secondary to endocarditis, bioprosthetic per patient.    4.  History of hepatitis B, advised her to " follow-up with GI as an outpatient basis.    5.  On SCDs for DVT prophylaxis.    6.  CODE STATUS is full code.    7.  Estimated discharge date, 3/13/2025     Copy text on this note has been reviewed by me on 3/12/2025        Remberto Richard MD  [facility]  03/12/25  14:31 EDT

## 2025-03-12 NOTE — CONSULTS
Attempted a second time to complete consultation on pt; however pt declined again and states she does not want to participate at this time. Pt agrees to request ACCESS to return should she change her mind. Update RN who is in agreement with plan.   ACCESS will sign off, please re-consult at this time.

## 2025-03-12 NOTE — CONSULTS
"Referring Provider: Erica Cota MD  Reason for Consultation:     spine osteomyelitis     Chief Complaint   Patient presents with    Abscess     Back pain         Subjective   History of present illness: Patient is a 44-year-old female with past medical history of IVDU, heart failure, endocarditis in 2019 status post mitral valve repair in 2020 and subsequent mitral valve failure with porcine valve replacement on 11/5/2025 with a long treatment history with antibiotics with infectious disease at [facility].  More recently found to have osteomyelitis and discitis at T6/7 and L3/4 on IV daptomycin and ceftriaxone through an end date of 4/1/2025.  ID consulted for \"spine osteomyelitis\".    Patient admitted with back pain and neurosurgery has evaluated with MRI lumbar spine.  Imaging with continued evidence of osteomyelitis similar to prior.  Small 13 mm area consistent with possible abscess.  No surgical intervention recommended.    Today patient reports she continues to have intense pain.  States the pain has been present since starting antibiotics.  She has not noticed any improvement.  States the pain medications do not help.  Denies any recent IVDU but states she has been tempted due to her severe pain.  Has been performing her own antibiotic injections at home.  Denies any side effects with the medications.    History reviewed. No pertinent past medical history.    Past Surgical History:   Procedure Laterality Date    CARDIAC VALVE REPLACEMENT      DILATATION AND CURETTAGE      TUBAL ABDOMINAL LIGATION         family history is not on file.     reports that she has been smoking cigarettes. She does not have any smokeless tobacco history on file. She reports that she does not currently use alcohol. She reports current drug use. Drug: Marijuana.     Allergies   Allergen Reactions    Toradol [Ketorolac Tromethamine] Hives    Zofran [Ondansetron] Rash       Medication:  Antibiotics:  Anti-Infectives " (From admission, onward)      Ordered     Dose/Rate Route Frequency Start Stop    03/11/25 1256  DAPTOmycin (CUBICIN) 650 mg in sodium chloride 0.9 % 50 mL IVPB        Ordering Provider: Cecy Allen APRN    10 mg/kg × 62.6 kg  100 mL/hr over 30 Minutes Intravenous Every 24 Hours 03/12/25 0900 04/01/25 2359    03/11/25 1247  cefTRIAXone (ROCEPHIN) 2,000 mg in sodium chloride 0.9 % 100 mL MBP        Ordering Provider: Cecy Allen APRN    2,000 mg  200 mL/hr over 30 Minutes Intravenous Every 24 Hours 03/12/25 0330 04/01/25 2359    03/11/25 1247  cefTRIAXone (ROCEPHIN) 1,000 mg in sodium chloride 0.9 % 100 mL MBP        Ordering Provider: Cecy Allen APRN    1,000 mg  200 mL/hr over 30 Minutes Intravenous Once 03/11/25 1345 03/11/25 1602    03/10/25 0314  DAPTOmycin (CUBICIN) 500 mg in sodium chloride 0.9 % 50 mL IVPB  Status:  Discontinued        Ordering Provider: Cecy Allen APRN    8 mg/kg × 62.6 kg  100 mL/hr over 30 Minutes Intravenous Every 24 Hours 03/10/25 0330 03/11/25 1256    03/10/25 0304  cefTRIAXone (ROCEPHIN) 1,000 mg in sodium chloride 0.9 % 100 mL MBP  Status:  Discontinued        Ordering Provider: Cecy Allen APRN    1,000 mg  200 mL/hr over 30 Minutes Intravenous Every 24 Hours 03/10/25 0320 03/11/25 1247    03/10/25 0304  DAPTOmycin (CUBICIN) 400 mg in sodium chloride 0.9 % 50 mL IVPB  Status:  Discontinued        Ordering Provider: Cecy Allen APRN    6 mg/kg × 62.6 kg  100 mL/hr over 30 Minutes Intravenous Every 24 Hours 03/10/25 0320 03/10/25 0313              Objective     Physical Exam:   Vital Signs   Temp:  [97.5 °F (36.4 °C)-98.2 °F (36.8 °C)] 97.9 °F (36.6 °C)  Heart Rate:  [] 84  Resp:  [18] 18  BP: (103-131)/(63-90) 131/90    GENERAL: Awake and alert, in no acute distress.   HEENT: Oropharynx is clear. Hearing is grossly normal.   EYES: PERRL. No conjunctival injection. No lid lag.   LUNGS: Normal work of breathing.  SKIN: Warm  "and dry without cutaneous eruptions in exposed areas.  PSYCHIATRIC: Appropriate mood, affect    Results Review:   I reviewed the patient's new clinical results.  I reviewed the patient's new imaging results and agree with the interpretation.  I reviewed the patient's other test results and agree with the interpretation    Lab Results   Component Value Date    WBC 5.42 03/12/2025    HGB 10.5 (L) 03/12/2025    HCT 31.7 (L) 03/12/2025    MCV 82.6 03/12/2025     03/12/2025       No results found for: \"VANCOPEAK\", \"VANCOTROUGH\", \"VANCORANDOM\"    Lab Results   Component Value Date    GLUCOSE 111 (H) 03/12/2025    BUN 12 03/12/2025    CREATININE 0.49 (L) 03/12/2025    EGFRIFNONA 85 03/23/2022    EGFRIFAFRI 99 03/23/2022    BCR 24.5 03/12/2025    CO2 26.6 03/12/2025    CALCIUM 9.4 03/12/2025    ALBUMIN 3.8 03/12/2025    LABIL2 1.1 09/20/2020    AST 15 03/12/2025    ALT 10 03/12/2025         Estimated Creatinine Clearance: 123.9 mL/min (A) (by C-G formula based on SCr of 0.49 mg/dL (L)).    Isolation:   No active isolations      Microbiology:  Microbiology Results (last 10 days)       ** No results found for the last 240 hours. **             Radiology:  MRI report reviewed with continued evidence of osteomyelitis at L3-4    Assessment     #Spinal osteomyelitis L3/4 and T6/7  #IVDU  #Endocarditis status post mitral valve replacement 11/5/2024  #Hepatitis B    No surgical intervention per neurosurgery.  Outpatient ID notes reviewed.  Prior cultures have all been negative.  Imaging likely has some lag time associated with improvement.  New small area of possible abscess though not big enough for biopsy or intervention.    Recommend continuing outpatient infectious disease provider recommendations with ceftriaxone 2 g daily and daptomycin 10 mg/kg.  Follow-up with her outpatient provider and labs per their recommendations.      Thank you for allowing me to be involved in the care of this patient. Infectious diseases will " sign off at this time with antibiotics plan in place, but please call me at [phone] if any further ID questions or new ID concerns.    ------------------------------------------------------------------------------------------------  The above decisions regarding antimicrobials incorporates elements of engaging in complex medical decision-making associated with antimicrobial prescribing including considerations such as antimicrobial resistance patterns, emergence of new variants/strains, recent antibiotic exposure, interactions/complications from comorbidities including concurrent infections, public health considerations to minimize development of antimicrobial resistance, and emerging and re-emerging infections.

## 2025-03-12 NOTE — PLAN OF CARE
Goal Outcome Evaluation:         Progress: no change  Outcome Evaluation: pt is on room air with stable vital signs. CHG bath during shift. prn pain meds given per orders. call light within reach. plan of care ongoing.

## 2025-03-13 ENCOUNTER — READMISSION MANAGEMENT (OUTPATIENT)
Dept: CALL CENTER | Facility: HOSPITAL | Age: 45
End: 2025-03-13
Payer: MEDICARE

## 2025-03-13 VITALS
RESPIRATION RATE: 18 BRPM | SYSTOLIC BLOOD PRESSURE: 130 MMHG | DIASTOLIC BLOOD PRESSURE: 87 MMHG | TEMPERATURE: 97.7 F | WEIGHT: 135.7 LBS | BODY MASS INDEX: 24.97 KG/M2 | OXYGEN SATURATION: 98 % | HEART RATE: 94 BPM | HEIGHT: 62 IN

## 2025-03-13 LAB
ALBUMIN SERPL-MCNC: 3.9 G/DL (ref 3.5–5.2)
ALBUMIN/GLOB SERPL: 1 G/DL
ALP SERPL-CCNC: 52 U/L (ref 39–117)
ALT SERPL W P-5'-P-CCNC: 12 U/L (ref 1–33)
ANION GAP SERPL CALCULATED.3IONS-SCNC: 10.3 MMOL/L (ref 5–15)
AST SERPL-CCNC: 20 U/L (ref 1–32)
BASOPHILS # BLD AUTO: 0.01 10*3/MM3 (ref 0–0.2)
BASOPHILS NFR BLD AUTO: 0.2 % (ref 0–1.5)
BILIRUB SERPL-MCNC: <0.2 MG/DL (ref 0–1.2)
BUN SERPL-MCNC: 12 MG/DL (ref 6–20)
BUN/CREAT SERPL: 21.4 (ref 7–25)
CALCIUM SPEC-SCNC: 9.5 MG/DL (ref 8.6–10.5)
CHLORIDE SERPL-SCNC: 102 MMOL/L (ref 98–107)
CO2 SERPL-SCNC: 28.7 MMOL/L (ref 22–29)
CREAT SERPL-MCNC: 0.56 MG/DL (ref 0.57–1)
DEPRECATED RDW RBC AUTO: 43.2 FL (ref 37–54)
EGFRCR SERPLBLD CKD-EPI 2021: 114.1 ML/MIN/1.73
EOSINOPHIL # BLD AUTO: 0.12 10*3/MM3 (ref 0–0.4)
EOSINOPHIL NFR BLD AUTO: 2.1 % (ref 0.3–6.2)
ERYTHROCYTE [DISTWIDTH] IN BLOOD BY AUTOMATED COUNT: 14.3 % (ref 12.3–15.4)
GLOBULIN UR ELPH-MCNC: 3.8 GM/DL
GLUCOSE SERPL-MCNC: 106 MG/DL (ref 65–99)
HCT VFR BLD AUTO: 33.1 % (ref 34–46.6)
HGB BLD-MCNC: 10.7 G/DL (ref 12–15.9)
IMM GRANULOCYTES # BLD AUTO: 0.02 10*3/MM3 (ref 0–0.05)
IMM GRANULOCYTES NFR BLD AUTO: 0.3 % (ref 0–0.5)
LYMPHOCYTES # BLD AUTO: 1.32 10*3/MM3 (ref 0.7–3.1)
LYMPHOCYTES NFR BLD AUTO: 23 % (ref 19.6–45.3)
MCH RBC QN AUTO: 26.9 PG (ref 26.6–33)
MCHC RBC AUTO-ENTMCNC: 32.3 G/DL (ref 31.5–35.7)
MCV RBC AUTO: 83.2 FL (ref 79–97)
MONOCYTES # BLD AUTO: 0.39 10*3/MM3 (ref 0.1–0.9)
MONOCYTES NFR BLD AUTO: 6.8 % (ref 5–12)
NEUTROPHILS NFR BLD AUTO: 3.89 10*3/MM3 (ref 1.7–7)
NEUTROPHILS NFR BLD AUTO: 67.6 % (ref 42.7–76)
NRBC BLD AUTO-RTO: 0 /100 WBC (ref 0–0.2)
PLATELET # BLD AUTO: 251 10*3/MM3 (ref 140–450)
PMV BLD AUTO: 9.3 FL (ref 6–12)
POTASSIUM SERPL-SCNC: 3.8 MMOL/L (ref 3.5–5.2)
PROT SERPL-MCNC: 7.7 G/DL (ref 6–8.5)
RBC # BLD AUTO: 3.98 10*6/MM3 (ref 3.77–5.28)
SODIUM SERPL-SCNC: 141 MMOL/L (ref 136–145)
WBC NRBC COR # BLD AUTO: 5.75 10*3/MM3 (ref 3.4–10.8)

## 2025-03-13 PROCEDURE — 80053 COMPREHEN METABOLIC PANEL: CPT | Performed by: INTERNAL MEDICINE

## 2025-03-13 PROCEDURE — 85025 COMPLETE CBC W/AUTO DIFF WBC: CPT | Performed by: INTERNAL MEDICINE

## 2025-03-13 PROCEDURE — 25010000002 CEFTRIAXONE PER 250 MG: Performed by: NURSE PRACTITIONER

## 2025-03-13 RX ORDER — HYDROCODONE BITARTRATE AND ACETAMINOPHEN 5; 325 MG/1; MG/1
2 TABLET ORAL EVERY 6 HOURS PRN
Qty: 8 TABLET | Refills: 0 | Status: SHIPPED | OUTPATIENT
Start: 2025-03-13

## 2025-03-13 RX ADMIN — Medication 10 ML: at 08:57

## 2025-03-13 RX ADMIN — HYDROCODONE BITARTRATE AND ACETAMINOPHEN 2 TABLET: 5; 325 TABLET ORAL at 10:15

## 2025-03-13 RX ADMIN — CYCLOBENZAPRINE HYDROCHLORIDE 10 MG: 10 TABLET, FILM COATED ORAL at 08:56

## 2025-03-13 RX ADMIN — APIXABAN 5 MG: 5 TABLET, FILM COATED ORAL at 08:56

## 2025-03-13 RX ADMIN — METOPROLOL SUCCINATE 50 MG: 50 TABLET, EXTENDED RELEASE ORAL at 08:56

## 2025-03-13 RX ADMIN — TRAZODONE HYDROCHLORIDE 50 MG: 50 TABLET ORAL at 08:56

## 2025-03-13 RX ADMIN — TIZANIDINE 4 MG: 4 TABLET ORAL at 04:01

## 2025-03-13 RX ADMIN — CEFTRIAXONE 2000 MG: 2 INJECTION, POWDER, FOR SOLUTION INTRAMUSCULAR; INTRAVENOUS at 03:03

## 2025-03-13 RX ADMIN — HYDROCODONE BITARTRATE AND ACETAMINOPHEN 2 TABLET: 5; 325 TABLET ORAL at 04:01

## 2025-03-13 NOTE — CASE MANAGEMENT/SOCIAL WORK
Continued Stay Note  [facility]     Patient Name: Dawn Young  MRN: [id number]  Today's Date: 3/13/2025    Admit Date: 3/9/2025    Plan: Home, family to transport.   Discharge Plan       Row Name 03/13/25 0845       Plan    Plan Home, family to transport.    Patient/Family in Agreement with Plan yes    Plan Comments CCP met with pt at the bedside to confirm DC. Pt confirmed plan is to return home, family to transport but may need Lyft if family unable to transport. Pt also stated she has all needed supplies from [facility] at home for IV Abx and see's [facility] every Sunday for PICC dressing changes and lab work. Shon ZARAGOZA/CCP                   Discharge Codes    No documentation.                 Expected Discharge Date and Time       Expected Discharge Date Expected Discharge Time    Mar 13, 2025               Evelina Bullard RN

## 2025-03-13 NOTE — PAYOR COMM NOTE
"Dawn Young (44 y.o. Female)      PATIENT DISCHARGED 03/13/25:  ref# [id number]      UR: FAX: [phone]    [phone]     [facility]: NPI [id number]  [facility]#  [id number]     MATILDE GONZALEZ RN,CCP       Date of Birth   1980    Social Security Number       Address   [address] [place] [address]    Home Phone   [phone]    MRN   [id number]       [place]   None    Marital Status                               Admission Date   3/9/2025    Admission Type   Emergency    Admitting Provider   Remberto Richard MD    Attending Provider       Department, Room/Bed   [address], S417/1       Discharge Date   3/13/2025    Discharge Disposition   Home or Self Care    Discharge Destination                                 Attending Provider: (none)   Allergies: Toradol [Ketorolac Tromethamine], Zofran [Ondansetron]    Isolation: None   Infection: None   Code Status: Prior    Ht: 157.5 cm (62\")   Wt: 61.6 kg (135 lb 11.2 oz)    Admission Cmt: None   Principal Problem: Osteomyelitis of spine [M46.20]                   Active Insurance as of 3/9/2025       Primary Coverage       Payor Plan Insurance Group Employer/Plan Group    AETNA MEDICARE REPLACEMENT AETNA MEDICARE REPLACEMENT [id number]       Payor Plan Address Payor Plan Phone Number Payor Plan Fax Number Effective Dates    [address] [phone]  1/1/2024 - None Entered    [facility] [id number]         Subscriber Name Subscriber Birth Date Member ID       DAWN YOUNG 1980 [id number]                     Emergency Contacts        (Rel.) Home Phone Work Phone Mobile Phone    SAMMY BARRIENTOS (Daughter) [phone] -- [phone]    Karen Barrientos (Daughter) -- -- [phone]             Discharge Summary        Winston Caro MD at 03/13/25 1002          Date of Discharge:  3/13/2025    PCP: Justa Wong, APRN    Discharge Diagnosis:   Active " "Hospital Problems    Diagnosis  POA    **Osteomyelitis of spine [M46.20]  Yes    Osteomyelitis [M86.9]  Yes    Epidural abscess [G06.2]  Unknown    Hepatitis B [B19.10]  Yes    H/O mitral valve replacement [Z95.2]  Not Applicable    IVDU (intravenous drug user) [F19.90]  Yes      Resolved Hospital Problems   No resolved problems to display.          Consults       Date and Time Order Name Status Description    3/11/2025  4:04 PM Inpatient Infectious Diseases Consult Completed     3/11/2025 11:27 AM Inpatient Neurosurgery Consult Completed     3/10/2025  1:37 AM [facility] (on-call MD unless specified) Details            Hospital Course  Patient is a 44 y.o. female with past medical history of IVDU, heart failure, endocarditis in 2019 status post mitral valve repair in 2020 and subsequent mitral valve failure with porcine valve replacement on 11/5/2025 with a long treatment history with antibiotics with infectious disease at [facility].  More recently found to have osteomyelitis and discitis at T6/7 and L3/4 on IV daptomycin and ceftriaxone through an end date of 4/1/2025.     She came to the hospital because of back pain. She was seen by neurosurgery and ID. MRI of her spine did not show any worsening osteomyelitis and neurosurgery did not recommend any surgical intervention. ID felt that there would be some leg time associated with improvement. There was a small area of possible psoas abscess but not big enough for biopsy or intervention. ID recommended that she continue with antibiotics as previously recommended by outpatient ID provider (ceftriaxone and daptomycin). She told neurosurgery that she would not use her PICC line for illicit drugs or use heroin again.    Patient is without new complaint today. She states she is \"ready to go\" for discharge. Neurosurgery recommended lidocaine patches however the patient has been refusing the lidocaine. She has been taking some hydrocodone/acetaminophen here and is " requesting some for discharge.    I discussed the patient's findings and my recommendations with patient and nursing staff.    Temp:  [97.3 °F (36.3 °C)-98.2 °F (36.8 °C)] 97.7 °F (36.5 °C)  Heart Rate:  [80-98] 94  Resp:  [18] 18  BP: ()/(69-87) 130/87  Body mass index is 24.82 kg/m².    Physical Exam  Constitutional:       General: She is not in acute distress.     Appearance: She is ill-appearing (chronic). She is not toxic-appearing.   HENT:      Head: Normocephalic and atraumatic.   Cardiovascular:      Rate and Rhythm: Normal rate and regular rhythm.   Pulmonary:      Effort: Pulmonary effort is normal. No respiratory distress.      Breath sounds: Normal breath sounds. No wheezing or rhonchi.   Abdominal:      General: Bowel sounds are normal.      Palpations: Abdomen is soft.      Tenderness: There is no abdominal tenderness. There is no guarding or rebound.   Musculoskeletal:         General: No swelling.   Skin:     General: Skin is warm and dry.   Neurological:      General: No focal deficit present.      Mental Status: She is alert and oriented to person, place, and time.   Psychiatric:         Mood and Affect: Mood normal.         Behavior: Behavior normal.       Disposition: Home or Self Care       Discharge Medications        New Medications        Instructions Start Date   cefTRIAXone 2,000 mg in sodium chloride 0.9 % 100 mL IVPB   2,000 mg, Intravenous, Every 24 Hours   Start Date: March 14, 2025     DAPTOmycin 650 mg in sodium chloride 0.9 % 50 mL   10 mg/kg (650 mg), Intravenous, Every 24 Hours      HYDROcodone-acetaminophen 5-325 MG per tablet  Commonly known as: NORCO   2 tablets, Oral, Every 6 Hours PRN      naloxone 4 MG/0.1ML nasal spray  Commonly known as: NARCAN   Call 911. Don't prime. [place] in 1 nostril for overdose. Repeat in 2-3 minutes in other nostril if no or minimal breathing/responsiveness.             Continue These Medications        Instructions Start Date   apixaban 5 MG  tablet tablet  Commonly known as: ELIQUIS   5 mg, Every 12 Hours Scheduled      metoprolol succinate XL 50 MG 24 hr tablet  Commonly known as: TOPROL-XL   50 mg, Daily      tiZANidine 4 MG tablet  Commonly known as: ZANAFLEX   4 mg, Every 8 Hours PRN      traZODone 50 MG tablet  Commonly known as: DESYREL   50 mg, 3 Times Daily              Diet Instructions       Diet: Regular/House Diet      Discharge Diet: Regular/House Diet    Texture: Regular Texture (IDDSI 7)    Fluid Consistency: Thin (IDDSI 0)           Activity Instructions       Activity as Tolerated             Additional Instructions for the Follow-ups that You Need to Schedule       Call MD for problems / concerns.   As directed             Follow-up Information       Justa Wong APRN Follow up in 1 week(s).    Specialty: Nurse Practitioner  Why: After hospital follow up  Contact information:  [address]  [place] [id number]  [phone]                          No future appointments.     Winston Caro MD  [facility]  03/13/25 10:13 EDT    Discharge time spent greater than 30 minutes.    Electronically signed by Winston Caro MD at 03/13/25 1013       Discharge Order (From admission, onward)       Start     Ordered    03/13/25 1000  Discharge patient  Once        Expected Discharge Date: 03/13/25   Discharge Disposition: Home or Self Care   Physician of Record for Attribution - Please select from Treatment Team: JEREL VAIL [[id number]]   Review needed by CMO to determine Physician of Record: No      Question Answer Comment   Physician of Record for Attribution - Please select from Treatment Team JEREL VAIL    Review needed by CMO to determine Physician of Record No        03/13/25 1001                    Evelina gonzales, RN   Registered Nurse  Case Management     Case Management/Social Work      Signed     Date of Service: 03/13/25 1228  Creation Time: 03/13/25 1228     Signed          Case Management Discharge Note        Final Note: Home via Lyft with [facility] for IV ABX. No additional CCP needs.        Selected Continued Care - Admitted Since 3/9/2025         Destination    No services have been selected for the patient.                    Durable Medical Equipment    No services have been selected for the patient.                    Dialysis/Infusion         Service Provider Services Address Phone Fax Patient Preferred     [facility] Infusion and IV Therapy, Infusion Clinic [address] [place] [address] [phone] --                  Home Medical Care    No services have been selected for the patient.                    Therapy    No services have been selected for the patient.                    Community Resources    No services have been selected for the patient.                    Community & DME    No services have been selected for the patient.                         Transportation Services  Taxi: other (Lyft)     Final Discharge Disposition Code: 01 - home or self-care

## 2025-03-13 NOTE — PLAN OF CARE
Problem: Adult Inpatient Plan of Care  Goal: Plan of Care Review  Outcome: Progressing  Flowsheets (Taken 3/13/2025 0404)  Outcome Evaluation: Pt AxOx4, medications given as scheduled, PRN pain meds given x2, room air, VSS, continue plan of care  Plan of Care Reviewed With: patient  Goal: Patient-Specific Goal (Individualized)  Outcome: Progressing  Goal: Absence of Hospital-Acquired Illness or Injury  Outcome: Progressing  Intervention: Identify and Manage Fall Risk  Recent Flowsheet Documentation  Taken 3/13/2025 0400 by Crystal Mccartney RN  Safety Promotion/Fall Prevention:   safety round/check completed   activity supervised   assistive device/personal items within reach   clutter free environment maintained   fall prevention program maintained   nonskid shoes/slippers when out of bed  Taken 3/13/2025 0218 by Crystal Mccartney RN  Safety Promotion/Fall Prevention:   safety round/check completed   activity supervised   assistive device/personal items within reach   clutter free environment maintained   fall prevention program maintained   nonskid shoes/slippers when out of bed  Taken 3/12/2025 2228 by Crystal Mccartney RN  Safety Promotion/Fall Prevention:   safety round/check completed   activity supervised   assistive device/personal items within reach   clutter free environment maintained   fall prevention program maintained   nonskid shoes/slippers when out of bed  Taken 3/12/2025 2037 by Crystal Mccartney RN  Safety Promotion/Fall Prevention:   safety round/check completed   activity supervised   assistive device/personal items within reach   clutter free environment maintained   fall prevention program maintained   nonskid shoes/slippers when out of bed  Intervention: Prevent Skin Injury  Recent Flowsheet Documentation  Taken 3/13/2025 0400 by Crystal Mccartney RN  Body Position: position changed independently  Taken 3/13/2025 0218 by Crystal Mccartney RN  Body Position: position changed independently  Taken  3/12/2025 2228 by Crystal Mccartney RN  Body Position:   side-lying   right  Taken 3/12/2025 2037 by Crystal Mccartney RN  Body Position: position changed independently  Intervention: Prevent Infection  Recent Flowsheet Documentation  Taken 3/13/2025 0218 by Crystal Mccartney RN  Infection Prevention: cohorting utilized  Taken 3/12/2025 2037 by Crystal Mccartney RN  Infection Prevention: cohorting utilized  Goal: Optimal Comfort and Wellbeing  Outcome: Progressing  Intervention: Provide Person-Centered Care  Recent Flowsheet Documentation  Taken 3/12/2025 2037 by Crystal Mccartney RN  Trust Relationship/Rapport:   care explained   choices provided   emotional support provided   empathic listening provided   questions answered   questions encouraged   reassurance provided   thoughts/feelings acknowledged  Goal: Readiness for Transition of Care  Outcome: Progressing     Problem: Comorbidity Management  Goal: Blood Pressure in Desired Range  Outcome: Progressing  Intervention: Maintain Blood Pressure Management  Recent Flowsheet Documentation  Taken 3/13/2025 0400 by Crystal Mccartney RN  Medication Review/Management: medications reviewed  Taken 3/13/2025 0218 by Crystal Mccartney RN  Medication Review/Management: medications reviewed  Taken 3/12/2025 2228 by Crystal Mccartney RN  Medication Review/Management: medications reviewed  Taken 3/12/2025 2037 by Crystal Mccartney RN  Medication Review/Management: medications reviewed     Problem: Fall Injury Risk  Goal: Absence of Fall and Fall-Related Injury  Outcome: Progressing  Intervention: Identify and Manage Contributors  Recent Flowsheet Documentation  Taken 3/13/2025 0400 by Crystal Mccartney RN  Medication Review/Management: medications reviewed  Taken 3/13/2025 0218 by Crystal Mccartney RN  Medication Review/Management: medications reviewed  Taken 3/12/2025 2228 by Crystal Mccartney RN  Medication Review/Management: medications reviewed  Taken 3/12/2025 2037 by Crystal Mccartney  RN  Medication Review/Management: medications reviewed  Intervention: Promote Injury-Free Environment  Recent Flowsheet Documentation  Taken 3/13/2025 0400 by Crystal Mccartney RN  Safety Promotion/Fall Prevention:   safety round/check completed   activity supervised   assistive device/personal items within reach   clutter free environment maintained   fall prevention program maintained   nonskid shoes/slippers when out of bed  Taken 3/13/2025 0218 by Crystal Mccartney, RN  Safety Promotion/Fall Prevention:   safety round/check completed   activity supervised   assistive device/personal items within reach   clutter free environment maintained   fall prevention program maintained   nonskid shoes/slippers when out of bed  Taken 3/12/2025 2228 by Crystal Mccartney, RN  Safety Promotion/Fall Prevention:   safety round/check completed   activity supervised   assistive device/personal items within reach   clutter free environment maintained   fall prevention program maintained   nonskid shoes/slippers when out of bed  Taken 3/12/2025 2037 by Crystal Mccartney, RN  Safety Promotion/Fall Prevention:   safety round/check completed   activity supervised   assistive device/personal items within reach   clutter free environment maintained   fall prevention program maintained   nonskid shoes/slippers when out of bed   Goal Outcome Evaluation:  Plan of Care Reviewed With: patient           Outcome Evaluation: Pt AxOx4, medications given as scheduled, PRN pain meds given x2, room air, VSS, continue plan of care

## 2025-03-13 NOTE — DISCHARGE SUMMARY
"Date of Discharge:  3/13/2025    PCP: Justa Wong APRN    Discharge Diagnosis:   Active Hospital Problems    Diagnosis  POA    **Osteomyelitis of spine [M46.20]  Yes    Osteomyelitis [M86.9]  Yes    Epidural abscess [G06.2]  Unknown    Hepatitis B [B19.10]  Yes    H/O mitral valve replacement [Z95.2]  Not Applicable    IVDU (intravenous drug user) [F19.90]  Yes      Resolved Hospital Problems   No resolved problems to display.          Consults       Date and Time Order Name Status Description    3/11/2025  4:04 PM Inpatient Infectious Diseases Consult Completed     3/11/2025 11:27 AM Inpatient Neurosurgery Consult Completed     3/10/2025  1:37 AM [facility] (on-call MD unless specified) Details            Hospital Course  Patient is a 44 y.o. female with past medical history of IVDU, heart failure, endocarditis in 2019 status post mitral valve repair in 2020 and subsequent mitral valve failure with porcine valve replacement on 11/5/2025 with a long treatment history with antibiotics with infectious disease at [facility].  More recently found to have osteomyelitis and discitis at T6/7 and L3/4 on IV daptomycin and ceftriaxone through an end date of 4/1/2025.     She came to the hospital because of back pain. She was seen by neurosurgery and ID. MRI of her spine did not show any worsening osteomyelitis and neurosurgery did not recommend any surgical intervention. ID felt that there would be some leg time associated with improvement. There was a small area of possible psoas abscess but not big enough for biopsy or intervention. ID recommended that she continue with antibiotics as previously recommended by outpatient ID provider (ceftriaxone and daptomycin). She told neurosurgery that she would not use her PICC line for illicit drugs or use heroin again.    Patient is without new complaint today. She states she is \"ready to go\" for discharge. Neurosurgery recommended lidocaine patches however the patient has " been refusing the lidocaine. She has been taking some hydrocodone/acetaminophen here and is requesting some for discharge.    I discussed the patient's findings and my recommendations with patient and nursing staff.    Temp:  [97.3 °F (36.3 °C)-98.2 °F (36.8 °C)] 97.7 °F (36.5 °C)  Heart Rate:  [80-98] 94  Resp:  [18] 18  BP: ()/(69-87) 130/87  Body mass index is 24.82 kg/m².    Physical Exam  Constitutional:       General: She is not in acute distress.     Appearance: She is ill-appearing (chronic). She is not toxic-appearing.   HENT:      Head: Normocephalic and atraumatic.   Cardiovascular:      Rate and Rhythm: Normal rate and regular rhythm.   Pulmonary:      Effort: Pulmonary effort is normal. No respiratory distress.      Breath sounds: Normal breath sounds. No wheezing or rhonchi.   Abdominal:      General: Bowel sounds are normal.      Palpations: Abdomen is soft.      Tenderness: There is no abdominal tenderness. There is no guarding or rebound.   Musculoskeletal:         General: No swelling.   Skin:     General: Skin is warm and dry.   Neurological:      General: No focal deficit present.      Mental Status: She is alert and oriented to person, place, and time.   Psychiatric:         Mood and Affect: Mood normal.         Behavior: Behavior normal.       Disposition: Home or Self Care       Discharge Medications        New Medications        Instructions Start Date   cefTRIAXone 2,000 mg in sodium chloride 0.9 % 100 mL IVPB   2,000 mg, Intravenous, Every 24 Hours   Start Date: March 14, 2025     DAPTOmycin 650 mg in sodium chloride 0.9 % 50 mL   10 mg/kg (650 mg), Intravenous, Every 24 Hours      HYDROcodone-acetaminophen 5-325 MG per tablet  Commonly known as: NORCO   2 tablets, Oral, Every 6 Hours PRN      naloxone 4 MG/0.1ML nasal spray  Commonly known as: NARCAN   Call 911. Don't prime. [place] in 1 nostril for overdose. Repeat in 2-3 minutes in other nostril if no or minimal  breathing/responsiveness.             Continue These Medications        Instructions Start Date   apixaban 5 MG tablet tablet  Commonly known as: ELIQUIS   5 mg, Every 12 Hours Scheduled      metoprolol succinate XL 50 MG 24 hr tablet  Commonly known as: TOPROL-XL   50 mg, Daily      tiZANidine 4 MG tablet  Commonly known as: ZANAFLEX   4 mg, Every 8 Hours PRN      traZODone 50 MG tablet  Commonly known as: DESYREL   50 mg, 3 Times Daily              Diet Instructions       Diet: Regular/House Diet      Discharge Diet: Regular/House Diet    Texture: Regular Texture (IDDSI 7)    Fluid Consistency: Thin (IDDSI 0)           Activity Instructions       Activity as Tolerated             Additional Instructions for the Follow-ups that You Need to Schedule       Call MD for problems / concerns.   As directed             Follow-up Information       Justa Wong, GILA Follow up in 1 week(s).    Specialty: Nurse Practitioner  Why: After hospital follow up  Contact information:  [address]  [place] [id number]  [phone]                          No future appointments.     Winston Caro MD  [facility]  03/13/25 10:13 EDT    Discharge time spent greater than 30 minutes.

## 2025-03-13 NOTE — CASE MANAGEMENT/SOCIAL WORK
Case Management Discharge Note      Final Note: Home via Lyft with [facility] for IV ABX. No additional CCP needs.         Selected Continued Care - Admitted Since 3/9/2025       Destination    No services have been selected for the patient.                Durable Medical Equipment    No services have been selected for the patient.                Dialysis/Infusion       Service Provider Services Address Phone Fax Patient Preferred    [facility] Infusion and IV Therapy, Infusion Clinic [address] [place] [address] [phone] --              Home Medical Care    No services have been selected for the patient.                Therapy    No services have been selected for the patient.                Community Resources    No services have been selected for the patient.                Community & DME    No services have been selected for the patient.                    Transportation Services  Taxi: other (Lyft)    Final Discharge Disposition Code: 01 - home or self-care

## 2025-03-14 NOTE — OUTREACH NOTE
Prep Survey      Flowsheet Row Responses   [facility] facility patient discharged from? [place]   Is LACE score < 7 ? No   Eligibility Readm Mgmt   Discharge diagnosis Osteomyelitis of spine   Does the patient have one of the following disease processes/diagnoses(primary or secondary)? Other   Does the patient have Home health ordered? Yes   What is the Home health agency?  [facility] for IV Abx.   Is there a DME ordered? No   Medication alerts for this patient [facility] for IV Abx.   Prep survey completed? Yes            HIMANSHU LUI - Registered Nurse

## 2025-03-24 ENCOUNTER — READMISSION MANAGEMENT (OUTPATIENT)
Dept: CALL CENTER | Facility: HOSPITAL | Age: 45
End: 2025-03-24
Payer: MEDICARE

## 2025-03-24 NOTE — OUTREACH NOTE
Medical Week 2 Survey      Flowsheet Row Responses   [facility] patient discharged from? [place]   Does the patient have one of the following disease processes/diagnoses(primary or secondary)? Other   Week 2 attempt successful? No   Unsuccessful attempts Attempt 1            Yumiko NATHAN - Registered Nurse

## 2025-03-27 ENCOUNTER — READMISSION MANAGEMENT (OUTPATIENT)
Dept: CALL CENTER | Facility: HOSPITAL | Age: 45
End: 2025-03-27
Payer: MEDICARE

## 2025-03-27 NOTE — OUTREACH NOTE
Medical Week 2 Survey      Flowsheet Row Responses   [facility] [place] patient discharged from? [place]   Does the patient have one of the following disease processes/diagnoses(primary or secondary)? Other   Week 2 attempt successful? No   Unsuccessful attempts Attempt 2            GOSIA FRIAS - Registered Nurse

## 2025-04-07 ENCOUNTER — READMISSION MANAGEMENT (OUTPATIENT)
Dept: CALL CENTER | Facility: HOSPITAL | Age: 45
End: 2025-04-07
Payer: MEDICARE

## 2025-04-07 NOTE — OUTREACH NOTE
Medical Week 3 Survey      Flowsheet Row Responses   [facility] patient discharged from? [place]   Does the patient have one of the following disease processes/diagnoses(primary or secondary)? Other   Week 3 attempt successful? Yes   Call start time 1504   Call end time 1505   Discharge diagnosis Osteomyelitis of spine   Person spoke with today (if not patient) and relationship patient   Meds reviewed with patient/caregiver? Yes   Is the patient having any side effects they believe may be caused by any medication additions or changes? No   Does the patient have all medications ordered at discharge? Yes   Is the patient taking all medications as directed (includes completed medication regime)? Yes   Comments regarding appointments Pt has seen surgeon.  2 more abcesses discovered, she will be having surgery.   Has the patient kept scheduled appointments due by today? Yes   Week 3 Call Completed? Yes   Is the patient interested in additional calls from an ambulatory ? No   Would this patient benefit from a Referral to Amb Social Work? No   Wrap up additional comments Brief call.  Patient is having significant back pain and is going to the ED now.   Call end time 1505            JAM BRITT - Registered Nurse